# Patient Record
Sex: MALE | Race: WHITE | URBAN - METROPOLITAN AREA
[De-identification: names, ages, dates, MRNs, and addresses within clinical notes are randomized per-mention and may not be internally consistent; named-entity substitution may affect disease eponyms.]

---

## 2018-08-24 ENCOUNTER — HOSPITAL ENCOUNTER (INPATIENT)
Facility: CLINIC | Age: 23
LOS: 2 days | Discharge: HOME | End: 2018-08-26
Attending: EMERGENCY MEDICINE | Admitting: INTERNAL MEDICINE
Payer: COMMERCIAL

## 2018-08-24 ENCOUNTER — APPOINTMENT (OUTPATIENT)
Dept: ULTRASOUND IMAGING | Facility: CLINIC | Age: 23
End: 2018-08-24
Attending: PHYSICIAN ASSISTANT
Payer: COMMERCIAL

## 2018-08-24 DIAGNOSIS — R11.2 NON-INTRACTABLE VOMITING WITH NAUSEA, UNSPECIFIED VOMITING TYPE: ICD-10-CM

## 2018-08-24 DIAGNOSIS — G89.18 ACUTE POST-OPERATIVE PAIN: Primary | ICD-10-CM

## 2018-08-24 DIAGNOSIS — R79.89 ELEVATED LFTS: ICD-10-CM

## 2018-08-24 DIAGNOSIS — K81.0 ACUTE CHOLECYSTITIS: ICD-10-CM

## 2018-08-24 PROCEDURE — 76705 ECHO EXAM OF ABDOMEN: CPT

## 2018-08-24 PROCEDURE — 86709 HEPATITIS A IGM ANTIBODY: CPT | Performed by: PHYSICIAN ASSISTANT

## 2018-08-24 PROCEDURE — 96365 THER/PROPH/DIAG IV INF INIT: CPT

## 2018-08-24 PROCEDURE — 25000128 H RX IP 250 OP 636: Performed by: INTERNAL MEDICINE

## 2018-08-24 PROCEDURE — 86790 VIRUS ANTIBODY NOS: CPT | Performed by: PHYSICIAN ASSISTANT

## 2018-08-24 PROCEDURE — 12000000 ZZH R&B MED SURG/OB

## 2018-08-24 PROCEDURE — 99285 EMERGENCY DEPT VISIT HI MDM: CPT | Mod: 25

## 2018-08-24 PROCEDURE — 99222 1ST HOSP IP/OBS MODERATE 55: CPT | Mod: AI | Performed by: INTERNAL MEDICINE

## 2018-08-24 PROCEDURE — 25000128 H RX IP 250 OP 636: Performed by: PHYSICIAN ASSISTANT

## 2018-08-24 PROCEDURE — 86803 HEPATITIS C AB TEST: CPT | Performed by: PHYSICIAN ASSISTANT

## 2018-08-24 PROCEDURE — 96361 HYDRATE IV INFUSION ADD-ON: CPT

## 2018-08-24 RX ORDER — ESCITALOPRAM OXALATE 10 MG/1
10 TABLET ORAL DAILY
Status: DISCONTINUED | OUTPATIENT
Start: 2018-08-25 | End: 2018-08-26 | Stop reason: HOSPADM

## 2018-08-24 RX ORDER — AMOXICILLIN 250 MG
1 CAPSULE ORAL 2 TIMES DAILY PRN
Status: DISCONTINUED | OUTPATIENT
Start: 2018-08-24 | End: 2018-08-26 | Stop reason: HOSPADM

## 2018-08-24 RX ORDER — BUPROPION HYDROCHLORIDE 150 MG/1
150 TABLET ORAL EVERY MORNING
COMMUNITY

## 2018-08-24 RX ORDER — MORPHINE SULFATE 2 MG/ML
2-4 INJECTION, SOLUTION INTRAMUSCULAR; INTRAVENOUS EVERY 4 HOURS PRN
Status: DISCONTINUED | OUTPATIENT
Start: 2018-08-24 | End: 2018-08-26 | Stop reason: HOSPADM

## 2018-08-24 RX ORDER — ONDANSETRON 2 MG/ML
4 INJECTION INTRAMUSCULAR; INTRAVENOUS EVERY 6 HOURS PRN
Status: DISCONTINUED | OUTPATIENT
Start: 2018-08-24 | End: 2018-08-26 | Stop reason: HOSPADM

## 2018-08-24 RX ORDER — AMOXICILLIN 250 MG
2 CAPSULE ORAL 2 TIMES DAILY
Status: DISCONTINUED | OUTPATIENT
Start: 2018-08-24 | End: 2018-08-26 | Stop reason: HOSPADM

## 2018-08-24 RX ORDER — PIPERACILLIN SODIUM, TAZOBACTAM SODIUM 3; .375 G/15ML; G/15ML
3.38 INJECTION, POWDER, LYOPHILIZED, FOR SOLUTION INTRAVENOUS ONCE
Status: COMPLETED | OUTPATIENT
Start: 2018-08-24 | End: 2018-08-24

## 2018-08-24 RX ORDER — ACETAMINOPHEN 325 MG/1
650 TABLET ORAL EVERY 4 HOURS PRN
Status: DISCONTINUED | OUTPATIENT
Start: 2018-08-24 | End: 2018-08-26 | Stop reason: HOSPADM

## 2018-08-24 RX ORDER — SODIUM CHLORIDE 9 MG/ML
INJECTION, SOLUTION INTRAVENOUS CONTINUOUS
Status: DISCONTINUED | OUTPATIENT
Start: 2018-08-24 | End: 2018-08-26 | Stop reason: HOSPADM

## 2018-08-24 RX ORDER — NALOXONE HYDROCHLORIDE 0.4 MG/ML
.1-.4 INJECTION, SOLUTION INTRAMUSCULAR; INTRAVENOUS; SUBCUTANEOUS
Status: DISCONTINUED | OUTPATIENT
Start: 2018-08-24 | End: 2018-08-26 | Stop reason: HOSPADM

## 2018-08-24 RX ORDER — BISACODYL 10 MG
10 SUPPOSITORY, RECTAL RECTAL DAILY PRN
Status: DISCONTINUED | OUTPATIENT
Start: 2018-08-24 | End: 2018-08-26 | Stop reason: HOSPADM

## 2018-08-24 RX ORDER — AMOXICILLIN 250 MG
1 CAPSULE ORAL 2 TIMES DAILY
Status: DISCONTINUED | OUTPATIENT
Start: 2018-08-24 | End: 2018-08-26 | Stop reason: HOSPADM

## 2018-08-24 RX ORDER — AMOXICILLIN 250 MG
2 CAPSULE ORAL 2 TIMES DAILY PRN
Status: DISCONTINUED | OUTPATIENT
Start: 2018-08-24 | End: 2018-08-26 | Stop reason: HOSPADM

## 2018-08-24 RX ORDER — ESCITALOPRAM OXALATE 10 MG/1
10 TABLET ORAL DAILY
COMMUNITY

## 2018-08-24 RX ORDER — BUSPIRONE HYDROCHLORIDE 5 MG/1
5 TABLET ORAL 3 TIMES DAILY
Status: DISCONTINUED | OUTPATIENT
Start: 2018-08-24 | End: 2018-08-26 | Stop reason: HOSPADM

## 2018-08-24 RX ORDER — ONDANSETRON 4 MG/1
4 TABLET, ORALLY DISINTEGRATING ORAL EVERY 6 HOURS PRN
Status: DISCONTINUED | OUTPATIENT
Start: 2018-08-24 | End: 2018-08-26 | Stop reason: HOSPADM

## 2018-08-24 RX ORDER — SODIUM CHLORIDE 9 MG/ML
1000 INJECTION, SOLUTION INTRAVENOUS CONTINUOUS
Status: DISCONTINUED | OUTPATIENT
Start: 2018-08-24 | End: 2018-08-26

## 2018-08-24 RX ORDER — BUPROPION HYDROCHLORIDE 150 MG/1
150 TABLET ORAL EVERY MORNING
Status: DISCONTINUED | OUTPATIENT
Start: 2018-08-25 | End: 2018-08-26 | Stop reason: HOSPADM

## 2018-08-24 RX ORDER — ONDANSETRON 2 MG/ML
4 INJECTION INTRAMUSCULAR; INTRAVENOUS EVERY 30 MIN PRN
Status: DISCONTINUED | OUTPATIENT
Start: 2018-08-24 | End: 2018-08-26

## 2018-08-24 RX ORDER — BUSPIRONE HYDROCHLORIDE 5 MG/1
5 TABLET ORAL 3 TIMES DAILY
COMMUNITY

## 2018-08-24 RX ADMIN — SODIUM CHLORIDE: 9 INJECTION, SOLUTION INTRAVENOUS at 23:04

## 2018-08-24 RX ADMIN — SODIUM CHLORIDE 1000 ML: 9 INJECTION, SOLUTION INTRAVENOUS at 20:41

## 2018-08-24 RX ADMIN — PIPERACILLIN SODIUM,TAZOBACTAM SODIUM 3.38 G: 3; .375 INJECTION, POWDER, FOR SOLUTION INTRAVENOUS at 22:01

## 2018-08-24 RX ADMIN — SODIUM CHLORIDE 1000 ML: 9 INJECTION, SOLUTION INTRAVENOUS at 22:01

## 2018-08-24 RX ADMIN — MORPHINE SULFATE 2 MG: 2 INJECTION, SOLUTION INTRAMUSCULAR; INTRAVENOUS at 23:49

## 2018-08-24 RX ADMIN — ONDANSETRON 4 MG: 2 INJECTION INTRAMUSCULAR; INTRAVENOUS at 23:47

## 2018-08-24 ASSESSMENT — ENCOUNTER SYMPTOMS
HEADACHES: 0
SHORTNESS OF BREATH: 0
DIARRHEA: 1
ABDOMINAL PAIN: 1
FEVER: 0
VOMITING: 1
NAUSEA: 1

## 2018-08-24 ASSESSMENT — PAIN DESCRIPTION - DESCRIPTORS: DESCRIPTORS: SHARP

## 2018-08-24 NOTE — IP AVS SNAPSHOT
[facility] [facility]    [address] [place] [address]    Phone:  [phone]                                       After Visit Summary   8/24/2018    Babs Monahan    MRN: [id number]           After Visit Summary Signature Page     I have received my discharge instructions, and my questions have been answered. I have discussed any challenges I see with this plan with the nurse or doctor.    ..........................................................................................................................................  Patient/Patient Representative Signature      ..........................................................................................................................................  Patient Representative Print Name and Relationship to Patient    ..................................................               ................................................  Date                                            Time    ..........................................................................................................................................  Reviewed by Signature/Title    ...................................................              ..............................................  Date                                                            Time          22EPIC Rev 08/18

## 2018-08-24 NOTE — IP AVS SNAPSHOT
MRN:[id number]                      After Visit Summary   8/24/2018    Babs Monahan    MRN: [id number]           Thank you!     Thank you for choosing [facility] for your care. Our goal is always to provide you with excellent care. Hearing back from our patients is one way we can continue to improve our services. Please take a few minutes to complete the written survey that you may receive in the mail after you visit with us. Thank you!        Patient Information     Date Of Birth          1995        Designated Caregiver       Most Recent Value    Caregiver    Will someone help with your care after discharge? yes    Name of designated caregiver Deloris    Phone number of caregiver [phone]    Caregiver address [address] [facility]      About your hospital stay     You were admitted on:  August 24, 2018 You last received care in the:  [facility] [address] [facility]    You were discharged on:  August 26, 2018        Reason for your hospital stay       Acute Cholecystitis with Cholelithiasis            Reason for your hospital stay       You were admitted with acute cholecystitis and had lap vaughn                  Who to Call     For medical emergencies, please call 911.  For non-urgent questions about your medical care, please call your primary care provider or clinic, None  For questions related to your surgery, please call your surgery clinic        Attending Provider     Provider Specialty    Jonathan Boucher MD Emergency Medicine    [facility], Hero Romero MD Internal Medicine       Primary Care Provider Fax #    Physician No Ref-Primary [phone]      After Care Instructions     Activity       Your activity upon discharge: activity as tolerated, ambulate in house, no driving while on analgesics and no heavy lifting for 2 weeks            Activity       Your activity upon discharge: activity as tolerated            Diet       Follow this diet upon discharge: Advance  to a regular diet as tolerated            Diet       Follow this diet upon discharge: Orders Placed This Encounter     Low fat diet            May discharge when       Discharge to home when the following criteria have been met:    Stable vital signs    Oral temperature < 100 o F    Return to baseline mental status    No bleeding at incision site    Able to tolerate oral fluids    Minimal pain reported and/or controlled with oral analgesics    Minimal nausea    Ambulates with assistance appropriate to age and health status  HOSPITALIST APPROVAL            Wound care and dressings       Instructions to care for your wound at home: may get incision wet in shower but do not soak or scrub.                  Follow-up Appointments     Follow-up and recommended labs and tests       Our office will contact you within 2 weeks to check on your progress and answer any questions you may have.  If you are doing well, you will not need to return for a follow up appointment.  If any concerns are identified over the phone, we may ask you to make an appointment to see a provider in our clinic.   If you have not received a phone call, have any questions or concerns, or would like to be seen, please call us at [phone] and ask to speak with our nurse.  We are located at [address] [place] [address].            Follow-up and recommended labs and tests        Follow up with Dr Degroot as needed.                  Pending Results     Date and Time Order Name Status Description    8/25/2018 0500 Hepatitis B core antibody IgM In process     8/25/2018 0500 Hepatitis B Surface Antibody In process     8/24/2018 2041 Hepatitis E Antibody IgM In process     8/24/2018 2041 Hepatitis A antibody IgM In process     8/24/2018 2035 Hepatitis C antibody In process             Statement of Approval     Ordered          08/26/18 1553  I have reviewed and agree with all the recommendations and orders detailed in this document.   "EFFECTIVE NOW     Approved and electronically signed by:  Hero Warner MD             Admission Information     Date & Time Provider Department Dept. Phone    2018 Hero Warner MD [facility] [address] [facility] [phone]      Your Vitals Were     Blood Pressure Pulse Temperature Respirations Height Weight    144/90 (BP Location: Left arm) 95 98  F (36.7  C) (Axillary) 16 1.676 m (5' 6\") 73.5 kg (162 lb 0.6 oz)    Pulse Oximetry BMI (Body Mass Index)                98% 26.15 kg/m2          MyChart Information     [facility] lets you send messages to your doctor, view your test results, renew your prescriptions, schedule appointments and more. To sign up, go to [email or web address] . Click on \"Log in\" on the left side of the screen, which will take you to the Welcome page. Then click on \"Sign up Now\" on the right side of the page.     You will be asked to enter the access code listed below, as well as some personal information. Please follow the directions to create your username and password.     Your access code is: [email or web address]  Expires: 2018  4:10 PM     Your access code will  in 90 days. If you need help or a new code, please call your [facility] clinic or [phone].        Care EveryWhere ID     This is your Care EveryWhere ID. This could be used by other organizations to access your [facility] medical records  [id number]        Equal Access to Services     [facility] : Hadii aad ku hadasho Soomaali, waaxda luqadaha, qaybta kaalmada yuliyaegyada, marilynn wright . So [facility] [phone].    ATENCIÓN: Si habla español, tiene a edwards disposición servicios gratuitos de asistencia lingüística. Llame al [phone].    We comply with applicable federal civil rights laws and [place] laws. We do not discriminate on the basis of race, color, national origin, age, disability, sex, sexual orientation, or gender identity.               Review of your " medicines      START taking        Dose / Directions    oxyCODONE IR 5 MG tablet   Commonly known as:  ROXICODONE   Used for:  Elevated LFTs, Acute post-operative pain        Dose:  5-10 mg   Take 1-2 tablets (5-10 mg) by mouth every 4 hours as needed for pain   Quantity:  20 tablet   Refills:  0         CONTINUE these medicines which have NOT CHANGED        Dose / Directions    buPROPion 150 MG 24 hr tablet   Commonly known as:  WELLBUTRIN XL        Dose:  150 mg   Take 150 mg by mouth every morning   Refills:  0       busPIRone 5 MG tablet   Commonly known as:  BUSPAR   Notes to Patient:  Skipped 4pm dose today as too close to prior dose        Dose:  5 mg   Take 5 mg by mouth 3 times daily   Refills:  0       escitalopram 10 MG tablet   Commonly known as:  LEXAPRO        Dose:  10 mg   Take 10 mg by mouth daily   Refills:  0       METHYLFOLATE PO   Notes to Patient:  Resume per prior routine        Refills:  0            Where to get your medicines      Some of these will need a paper prescription and others can be bought over the counter. Ask your nurse if you have questions.     Bring a paper prescription for each of these medications     oxyCODONE IR 5 MG tablet                Protect others around you: Learn how to safely use, store and throw away your medicines at www.disposemymeds.org.        Information about OPIOIDS     PRESCRIPTION OPIOIDS: WHAT YOU NEED TO KNOW   We gave you an opioid (narcotic) pain medicine. It is important to manage your pain, but opioids are not always the best choice. You should first try all the other options your care team gave you. Take this medicine for as short a time (and as few doses) as possible.    Some activities can increase your pain, such as bandage changes or therapy sessions. It may help to take your pain medicine 30 to 60 minutes before these activities. Reduce your stress by getting enough sleep, working on hobbies you enjoy and practicing relaxation or meditation.  Talk to your care team about ways to manage your pain beyond prescription opioids.    These medicines have risks:    DO NOT drive when on new or higher doses of pain medicine. These medicines can affect your alertness and reaction times, and you could be arrested for driving under the influence (DUI). If you need to use opioids long-term, talk to your care team about driving.    DO NOT operate heavy machinery    DO NOT do any other dangerous activities while taking these medicines.    DO NOT drink any alcohol while taking these medicines.     If the opioid prescribed includes acetaminophen, DO NOT take with any other medicines that contain acetaminophen. Read all labels carefully. Look for the word  acetaminophen  or  Tylenol.  Ask your pharmacist if you have questions or are unsure.    You can get addicted to pain medicines, especially if you have a history of addiction (chemical, alcohol or substance dependence). Talk to your care team about ways to reduce this risk.    All opioids tend to cause constipation. Drink plenty of water and eat foods that have a lot of fiber, such as fruits, vegetables, prune juice, apple juice and high-fiber cereal. Take a laxative (Miralax, milk of magnesia, Colace, Senna) if you don t move your bowels at least every other day. Other side effects include upset stomach, sleepiness, dizziness, throwing up, tolerance (needing more of the medicine to have the same effect), physical dependence and slowed breathing.    Store your pills in a secure place, locked if possible. We will not replace any lost or stolen medicine. If you don t finish your medicine, please throw away (dispose) as directed by your pharmacist. The [place] Pollution Control Agency has more information about safe disposal: https://www.pca.[facility].mn.us/living-green/managing-unwanted-medications             Medication List: This is a list of all your medications and when to take them. Check marks below indicate your daily  home schedule. Keep this list as a reference.      Medications           Morning Afternoon Evening Bedtime As Needed    buPROPion 150 MG 24 hr tablet   Commonly known as:  WELLBUTRIN XL   Take 150 mg by mouth every morning   Last time this was given:  150 mg on 8/26/2018  1:49 PM   Next Dose Due:  8/27 @8am                                   busPIRone 5 MG tablet   Commonly known as:  BUSPAR   Take 5 mg by mouth 3 times daily   Last time this was given:  5 mg on 8/26/2018  1:49 PM   Next Dose Due:  8/26 @9pm   Notes to Patient:  Skipped 4pm dose today as too close to prior dose                                         escitalopram 10 MG tablet   Commonly known as:  LEXAPRO   Take 10 mg by mouth daily   Last time this was given:  10 mg on 8/26/2018  1:49 PM   Next Dose Due:  8/27 @8am                                   METHYLFOLATE PO   Notes to Patient:  Resume per prior routine                                oxyCODONE IR 5 MG tablet   Commonly known as:  ROXICODONE   Take 1-2 tablets (5-10 mg) by mouth every 4 hours as needed for pain   Last time this was given:  5 mg on 8/26/2018  2:58 PM   Next Dose Due:  Available 8/26 @7pm

## 2018-08-25 ENCOUNTER — APPOINTMENT (OUTPATIENT)
Dept: MRI IMAGING | Facility: CLINIC | Age: 23
End: 2018-08-25
Attending: INTERNAL MEDICINE
Payer: COMMERCIAL

## 2018-08-25 LAB
ALBUMIN SERPL-MCNC: 3.7 G/DL (ref 3.4–5)
ALP SERPL-CCNC: 170 U/L (ref 40–150)
ALT SERPL W P-5'-P-CCNC: 761 U/L (ref 0–70)
ANION GAP SERPL CALCULATED.3IONS-SCNC: 6 MMOL/L (ref 3–14)
AST SERPL W P-5'-P-CCNC: 291 U/L (ref 0–45)
BILIRUB DIRECT SERPL-MCNC: 0.4 MG/DL (ref 0–0.2)
BILIRUB SERPL-MCNC: 1.5 MG/DL (ref 0.2–1.3)
BUN SERPL-MCNC: 8 MG/DL (ref 7–30)
CALCIUM SERPL-MCNC: 8.8 MG/DL (ref 8.5–10.1)
CHLORIDE SERPL-SCNC: 108 MMOL/L (ref 94–109)
CO2 SERPL-SCNC: 29 MMOL/L (ref 20–32)
CREAT SERPL-MCNC: 0.93 MG/DL (ref 0.66–1.25)
ERYTHROCYTE [DISTWIDTH] IN BLOOD BY AUTOMATED COUNT: 13 % (ref 10–15)
GFR SERPL CREATININE-BSD FRML MDRD: >90 ML/MIN/1.7M2
GLUCOSE SERPL-MCNC: 101 MG/DL (ref 70–99)
HCT VFR BLD AUTO: 44.8 % (ref 40–53)
HGB BLD-MCNC: 15.6 G/DL (ref 13.3–17.7)
INR PPP: 1.02 (ref 0.86–1.14)
MCH RBC QN AUTO: 31.5 PG (ref 26.5–33)
MCHC RBC AUTO-ENTMCNC: 34.8 G/DL (ref 31.5–36.5)
MCV RBC AUTO: 91 FL (ref 78–100)
PLATELET # BLD AUTO: 218 10E9/L (ref 150–450)
POTASSIUM SERPL-SCNC: 4 MMOL/L (ref 3.4–5.3)
PROT SERPL-MCNC: 6.8 G/DL (ref 6.8–8.8)
RBC # BLD AUTO: 4.95 10E12/L (ref 4.4–5.9)
SODIUM SERPL-SCNC: 143 MMOL/L (ref 133–144)
WBC # BLD AUTO: 6.3 10E9/L (ref 4–11)

## 2018-08-25 PROCEDURE — 25000128 H RX IP 250 OP 636: Performed by: INTERNAL MEDICINE

## 2018-08-25 PROCEDURE — A9585 GADOBUTROL INJECTION: HCPCS | Performed by: INTERNAL MEDICINE

## 2018-08-25 PROCEDURE — 99222 1ST HOSP IP/OBS MODERATE 55: CPT | Mod: 25 | Performed by: SURGERY

## 2018-08-25 PROCEDURE — 25000125 ZZHC RX 250: Performed by: INTERNAL MEDICINE

## 2018-08-25 PROCEDURE — 36415 COLL VENOUS BLD VENIPUNCTURE: CPT | Performed by: INTERNAL MEDICINE

## 2018-08-25 PROCEDURE — 80048 BASIC METABOLIC PNL TOTAL CA: CPT | Performed by: INTERNAL MEDICINE

## 2018-08-25 PROCEDURE — 80076 HEPATIC FUNCTION PANEL: CPT | Performed by: INTERNAL MEDICINE

## 2018-08-25 PROCEDURE — 12000000 ZZH R&B MED SURG/OB

## 2018-08-25 PROCEDURE — 85610 PROTHROMBIN TIME: CPT | Performed by: INTERNAL MEDICINE

## 2018-08-25 PROCEDURE — 74183 MRI ABD W/O CNTR FLWD CNTR: CPT

## 2018-08-25 PROCEDURE — 99232 SBSQ HOSP IP/OBS MODERATE 35: CPT | Performed by: INTERNAL MEDICINE

## 2018-08-25 PROCEDURE — 25000132 ZZH RX MED GY IP 250 OP 250 PS 637: Performed by: INTERNAL MEDICINE

## 2018-08-25 PROCEDURE — 86706 HEP B SURFACE ANTIBODY: CPT | Performed by: PHYSICIAN ASSISTANT

## 2018-08-25 PROCEDURE — 86705 HEP B CORE ANTIBODY IGM: CPT | Performed by: PHYSICIAN ASSISTANT

## 2018-08-25 PROCEDURE — 87350 HEPATITIS BE AG IA: CPT | Performed by: PHYSICIAN ASSISTANT

## 2018-08-25 PROCEDURE — 85027 COMPLETE CBC AUTOMATED: CPT | Performed by: INTERNAL MEDICINE

## 2018-08-25 RX ORDER — GADOBUTROL 604.72 MG/ML
10 INJECTION INTRAVENOUS ONCE
Status: COMPLETED | OUTPATIENT
Start: 2018-08-25 | End: 2018-08-25

## 2018-08-25 RX ADMIN — BUSPIRONE HYDROCHLORIDE 5 MG: 5 TABLET ORAL at 11:53

## 2018-08-25 RX ADMIN — BUSPIRONE HYDROCHLORIDE 5 MG: 5 TABLET ORAL at 16:05

## 2018-08-25 RX ADMIN — GADOBUTROL 10 ML: 604.72 INJECTION INTRAVENOUS at 16:49

## 2018-08-25 RX ADMIN — SENNOSIDES AND DOCUSATE SODIUM 2 TABLET: 8.6; 5 TABLET ORAL at 21:38

## 2018-08-25 RX ADMIN — BUPROPION HYDROCHLORIDE 150 MG: 150 TABLET, FILM COATED, EXTENDED RELEASE ORAL at 11:53

## 2018-08-25 RX ADMIN — PANTOPRAZOLE SODIUM 40 MG: 40 INJECTION, POWDER, FOR SOLUTION INTRAVENOUS at 08:25

## 2018-08-25 RX ADMIN — MORPHINE SULFATE 2 MG: 2 INJECTION, SOLUTION INTRAMUSCULAR; INTRAVENOUS at 20:28

## 2018-08-25 RX ADMIN — MORPHINE SULFATE 2 MG: 2 INJECTION, SOLUTION INTRAMUSCULAR; INTRAVENOUS at 08:22

## 2018-08-25 RX ADMIN — ESCITALOPRAM 10 MG: 10 TABLET, FILM COATED ORAL at 11:53

## 2018-08-25 RX ADMIN — ONDANSETRON 4 MG: 4 TABLET, ORALLY DISINTEGRATING ORAL at 07:26

## 2018-08-25 RX ADMIN — MORPHINE SULFATE 2 MG: 2 INJECTION, SOLUTION INTRAMUSCULAR; INTRAVENOUS at 15:17

## 2018-08-25 RX ADMIN — SODIUM CHLORIDE: 9 INJECTION, SOLUTION INTRAVENOUS at 18:47

## 2018-08-25 RX ADMIN — ONDANSETRON 4 MG: 4 TABLET, ORALLY DISINTEGRATING ORAL at 20:23

## 2018-08-25 RX ADMIN — SODIUM CHLORIDE: 9 INJECTION, SOLUTION INTRAVENOUS at 07:20

## 2018-08-25 ASSESSMENT — ACTIVITIES OF DAILY LIVING (ADL)
ADLS_ACUITY_SCORE: 11

## 2018-08-25 NOTE — PROGRESS NOTES
RECEIVING UNIT ED HANDOFF REVIEW    ED Nurse Handoff Report was reviewed by: Debra Begum on August 24, 2018 at 10:17 PM

## 2018-08-25 NOTE — CONSULTS
Surgery Consultation, [facility], CRISTEL Tinoco MD    Babs Monahan MRN# [id number]   YOB: 1995 Age: 23 year old     PCP:  No primary care provider on file. None    Chief Complaint:  Right upper quadrant pain, nausea, elevated liver function tests    History of Present Illness:  Babs Monahan is a 23 year old male who presented with several episodes of upper abdominal pain and intermittent nausea, usually after eating.  Commonly associated with eating greasy foods.  He was seen in an urgent care for increasing abdominal pain, nausea, and emesis.  This was thought to be partly due to an episode of binge drinking around this time.  Patient has a noted history of alcohol abuse.  He was diagnosed with dehydration and recommendation was made for increased oral intake of fluids.  Following his discharge from clinic it was noted that his LFTs were significantly elevated and he was directed to the emergency department.  Ultrasound was also performed which showed thickened gallbladder wall with gallstones.  This was thought to be potentially consistent with acute cholecystitis.  Patient has a strong family history of gallbladder disease.  He has never had any previous abdominal surgeries.  In discussions, he has had many episodes of upper abdominal pain, usually after eating.  Initially this began with rich fatty foods but has begun to occur with much more bland benign foods.  He has never had abdominal surgery.  We are asked to assess the patient and provide surgical recommendations.    PMH:  Babs Monahan has a history of previous alcohol abuse, depression  PSH:  Babs Monahan has not undergone any previous abdominal surgeries.    Home medications and allergies reviewed.    Social History:  Babs Monahan    Family History:  Babs Monahan family history is not on file.    ROS:  The 10 point Review of Systems is negative other than noted in the HPI.  Mild nausea currently without  "emesis.  Pain minimal.  No fever or chills.  No recent weight loss..    Physical Exam:  Blood pressure 120/71, pulse 90, temperature 98  F (36.7  C), temperature source Oral, resp. rate 16, height 1.676 m (5' 6\"), weight 77.5 kg (170 lb 13.7 oz), SpO2 95 %.  170 lbs 13.7 oz  Thin healthy young gentleman in no distress.  Patient has a pleasant affect, speaks without difficulty.   Pupils equal round and reactive to light.   No cervical lymphadenopathy or thyromegaly.   Lung fields clear, breathing comfortably.   Heart normal sinus rhythm.  No murmurs rubs or gallops.  Abdomen soft, nontender, nondistended.  Minimal tenderness in the right upper quadrant, no masses appreciated. No peritoneal signs or rebound.  Skin warm, dry, without rashes or lesions.    All new lab and imaging data was reviewed.  Abdominal ultrasound shows gallbladder with stones and sludge.  Some wall thickening, positive sonographic Thakkar sign.     Assessment/plan:  Babs Monahan is a 23 year old male with signs and symptoms suggesting acute cholecystitis.  Other possibilities include gastritis, hepatitis, or choledocholithiasis.  His labs certainly suggest some derangement within the liver but this could likely be due to acute cholecystitis.  Agree with initial suggestion for MRCP.  If this does not reveal any choledocholithiasis, I would recommend upper scopic cholecystectomy tomorrow.  This may be accompanied by intraoperative cholangiogram. Surgical comorbidities include history of alcohol abuse.  I feel the patient is a good candidate for the surgery and that this should be done while the patient is inpatient.  I will follow-up on the MRCP if this gets completed today but he is tentatively scheduled for surgery tomorrow.    Sergio Tinoco M.D.  [facility]  [phone]    Please route or send letter to:  Primary Care Provider (PCP) and Referring Provider  "

## 2018-08-25 NOTE — H&P
Admitted:     08/24/2018      DATE OF ADMISSION: 08/24/2018      PRIMARY CARE PROVIDER:  Jong Falk in [place].      CHIEF COMPLAINT:  Abdominal pain.      HISTORY OF PRESENT ILLNESS:  Babs Monahan is a very pleasant 23-year-old  gentleman with known history of alcoholism, but he had been dry for the last year and a half until a relapse about 3 days ago when he had 6 drinks.  Subsequent to that, the patient started having abdominal pain, nausea and vomiting.  This has been going on for the last 3 days.  He has extensive family history of biliary disease as well.  The patient also admits to increased psychosocial stressors including injury to his cat, and he has been too busy to cook, so he has been eating out a lot as well.  His pain seemed to get worse with food.  He went to urgent care clinic today.  He was seen and recommended that he drink Pedialyte.  He was discharged after getting some blood drawn and the patient had more Pedialyte, which did make him feel better.  However, after the labs came back, the urgent care was concerned and sent the patient to [facility] for further evaluation.  The specific labs that were noted were abnormal LFTs.  The patient's total bilirubin was elevated at 3.3, alkaline phosphatase 202, ALT was 1168, AST was 762 and lipase was 26.3.      In the Emergency Department, the patient was seen by Georgi To PA-C.  The patient was afebrile, vital signs were stable.  He did not receive any pain medications, but received Zosyn and a liter of normal saline.  Currently, the patient does not have any significant pain or nausea.  His mother accompanies him.      PAST MEDICAL HISTORY:    1.  History of alcoholism.    2.  Depression, generalized anxiety disorder.    3.  Reflux disease.      PAST SURGICAL HISTORY:  Dental surgery, extensive for bone reconstruction and implants due to poorly formed dentition at the age of 18.      FAMILY HISTORY:  Significant for  biliary disease in aunt, grandmother and grandfather.  There is also history of anxiety, depression, diabetes and anemia.      SOCIAL HISTORY:  He is single.  No tobacco.  History of alcoholism and has been sober for a year and a half until relapse about 3 days ago.  He works in finance.  He is the oldest of 5 kids.  Accompanied by his mother.      ALLERGIES:  NO KNOWN DRUG ALLERGIES.      CURRENT MEDICATIONS:   1.  Wellbutrin- mg daily.   2.  BuSpar 5 mg 3 times a day.   3.  Lexapro 10 mg once a day.    4.  Levomefolate glucosamine 1 tablet daily.      REVIEW OF SYSTEMS:  Ten-point review of systems reviewed.  Denies any fevers, chills, sweats. Positive for nausea, vomiting. No significant diarrhea.  No confusion, no bruising.  No hematemesis, no bloody stools.      PHYSICAL EXAMINATION:   VITAL SIGNS:  Temperature 98.5, heart rate 90, respiration 18, blood pressure 136/67, sats 97% on room air.   GENERAL:  The patient is alert, oriented x 3, no distress.   HEENT:  Unremarkable.   NECK:  Veins not distended.   LUNGS:  Clear to auscultation.   CARDIOVASCULAR:  S1, S2, regular rate and rhythm.   ABDOMEN:  Normoactive. He has tenderness in epigastrium and right upper quadrant region.  There is no guarding or rebound.   EXTREMITIES:  No edema.   NEUROLOGIC:  The patient was grossly nonfocal.      LABORATORY DATA:  As dictated in history of present illness.        IMAGING: The patient underwent limited ultrasound of the abdomen in the ED.  This showed gallstones with gallbladder wall thickening and positive sonographic Thakkar sign.  There is no biliary dilatation.      ASSESSMENT:  Babs Monahan is a 23-year-old with a history of binge drinking, 6 drinks of alcohol about 3 days ago, which started the patient having episodes of nausea and vomiting and now finding of abnormalities including elevation of AST, ALT and total bilirubin, but normal lipase, and ultrasound evidence of acute cholecystitis with cholelithiasis  and positive sonographic Thakkar sign, but no biliary dilatation.      PLAN:   1.  Acute cholecystitis.  The patient does have elevation of his transaminases as well as his bilirubin and alkaline phosphatase.  At this juncture, we cannot definitively rule out if the patient has any ductal stones.  We will proceed with an MRCP tonight.  We will keep the patient n.p.o., give him IV fluids, antinausea and pain medications.  We will obtain a formal Surgery consultation.  The patient received Zosyn in the Emergency Department.  Currently no indication to continue antibiotics.  We will repeat his transaminases and hope that these will be downtrending in the morning.  We will check an INR as well.   2.  Depression, anxiety.  We will continue the patient on his medications including Lexapro, BuSpar and Wellbutrin.   3.  History of reflux disease.  We will give the patient IV Protonix.   4.  Deep venous thrombosis prophylaxis.  The patient will receive compression boots.      CODE STATUS:  FULL.         RANDY JOSÉ MD             D: 2018   T: 2018   MT:       Name:     RACHANA KHALIL   MRN:      [id number]        Account:      [id number]   :      1995        Admitted:     2018                   Document: [id number]       cc: Jong Cabral [facility]

## 2018-08-25 NOTE — ED NOTES
"[facility]  ED Nurse Handoff Report    ED Chief complaint: Abnormal Labs (Patient was seen at urgent care today; reports elevated LFT's.  ) and Abdominal Pain (x 2-3 days with associated N/V/poor appetite.)      ED Diagnosis:   Final diagnoses:   Acute cholecystitis   Elevated LFTs   Non-intractable vomiting with nausea, unspecified vomiting type       Code Status: Full Code    Allergies: No Known Allergies    Activity level - Baseline/Home:  Independent    Activity Level - Current:   Independent     Needed?: No    Isolation: No  Infection: Not Applicable  Bariatric?: No    Vital Signs:   Vitals:    08/24/18 2013   BP: 136/67   Pulse: 90   Resp: 18   Temp: 98.5  F (36.9  C)   TempSrc: Oral   SpO2: 97%   Weight: 75.3 kg (166 lb)   Height: 1.676 m (5' 6\")       Cardiac Rhythm: ,        Pain level: 0-10 Pain Scale: 4    Is this patient confused?: No   F[place]ugh - Suicide Severity Rating Scale Completed?  Yes  If yes, what color did the patient score?  White    Patient Report: Initial Complaint: abdominal pain. abnormal labs   Focused Assessment:   Resp: WDL  Cardiac: WDL  Neuro: WDL  GI: pt has had abdominal pain x1-2 weeks. Getting worse. Nausea and vomiting. Patient went to urgent care today and liver enzymes were above 1000.   Tests Performed: labs, US  Abnormal Results: cholycystitis  Treatments provided: antibiotics, fluids    Family Comments: not present     OBS brochure/video discussed/provided to patient: N/A    ED Medications:   Medications   0.9% sodium chloride BOLUS (0 mLs Intravenous Stopped 8/24/18 9615)     Followed by   sodium chloride 0.9% infusion (not administered)   ondansetron (ZOFRAN) injection 4 mg (not administered)   piperacillin-tazobactam (ZOSYN) 3.375 g vial to attach to  mL bag (not administered)       Drips infusing?:  Yes    For the majority of the shift this patient was Green.   Interventions performed were n/a.    Severe Sepsis OR Septic Shock Diagnosis " Present: No      ED NURSE PHONE NUMBER: [phone]

## 2018-08-25 NOTE — PHARMACY-ADMISSION MEDICATION HISTORY
Admission medication history interview status for the 8/24/2018  admission is complete. See EPIC admission navigator for prior to admission medications     Medication history source reliability:Good    Actions taken by pharmacist (provider contacted, etc): Add medications     Additional medication history information not noted on PTA med list :Add all medications    Medication reconciliation/reorder completed by provider prior to medication history? No    Time spent in this activity: 20 minutes    Prior to Admission medications    Medication Sig Last Dose Taking? Auth Provider   buPROPion (WELLBUTRIN XL) 150 MG 24 hr tablet Take 150 mg by mouth every morning 8/23/2018 at Unknown time Yes Unknown, Entered By History   busPIRone (BUSPAR) 5 MG tablet Take 5 mg by mouth 3 times daily 8/23/2018 at Unknown time Yes Unknown, Entered By History   escitalopram (LEXAPRO) 10 MG tablet Take 10 mg by mouth daily 8/23/2018 at Unknown time Yes Unknown, Entered By History   Levomefolate Glucosamine (METHYLFOLATE PO)  Past Month at Unknown time Yes Unknown, Entered By History     August 24, 2018  Dario Rosa [facility].

## 2018-08-25 NOTE — PROGRESS NOTES
"[facility]  Hospitalist Progress Note  Hero Warner MD  08/25/2018    Assessment & Plan   ASSESSMENT:  Babs Monahan is a 23-year-old with a history of binge drinking, 6 drinks of alcohol about 3 days ago, which started the patient having episodes of nausea and vomiting and now finding of abnormalities including elevation of AST, ALT and total bilirubin, but normal lipase, and ultrasound evidence of acute cholecystitis with cholelithiasis and positive sonographic Thakkar sign, but no biliary dilatation.       PLAN:   1.  Acute cholecystitis.    - AST/ALT improving as compared to outside hospital values.  - clinically improved.  - await MRCP.  - plan for lap vaughn in AM with possible intraoperative cholangiogram.     2.  Depression, anxiety.    - continue the patient on his medications including Lexapro, BuSpar and Wellbutrin.   3.  History of reflux disease.   - continue with IV Protonix.   4.  Deep venous thrombosis prophylaxis.  The patient will receive compression boots.       CODE STATUS:  FULL.         Interval History   - chart reviewed  - abdominal pain better    -Data reviewed today: I reviewed all new labs and imaging over the last 24 hours. I personally reviewed no images or EKG's today.    Physical Exam   Heart Rate: 60, Blood pressure 120/71, pulse 90, temperature 98  F (36.7  C), temperature source Oral, resp. rate 16, height 1.676 m (5' 6\"), weight 77.5 kg (170 lb 13.7 oz), SpO2 95 %.  Vitals:    08/24/18 2013 08/25/18 0649   Weight: 75.3 kg (166 lb) 77.5 kg (170 lb 13.7 oz)     Vital Signs with Ranges  Temp:  [97.6  F (36.4  C)-98.5  F (36.9  C)] 98  F (36.7  C)  Pulse:  [90] 90  Heart Rate:  [60-73] 60  Resp:  [16-18] 16  BP: (117-136)/(66-78) 120/71  SpO2:  [95 %-97 %] 95 %  I/O's Last 24 hours       Constitutional: Awake, alert, cooperative, no apparent distress  Respiratory: Clear to auscultation bilaterally, no crackles or wheezing  Cardiovascular: Regular rate and rhythm, " normal S1 and S2, and no murmur noted  GI: Normal bowel sounds, soft, non-distended, mildly tender RUQ  Skin/Integumen: No rashes, no cyanosis, no edema  Other:      Medications   All medications were reviewed.    sodium chloride 1,000 mL (08/24/18 2201)     sodium chloride 100 mL/hr at 08/25/18 0720       buPROPion  150 mg Oral QAM     busPIRone  5 mg Oral TID     escitalopram  10 mg Oral Daily     pantoprazole (PROTONIX) IV  40 mg Intravenous Daily with breakfast     senna-docusate  1 tablet Oral BID    Or     senna-docusate  2 tablet Oral BID        Data     Recent Labs  Lab 08/25/18  0940   WBC 6.3   HGB 15.6   MCV 91      INR 1.02      POTASSIUM 4.0   CHLORIDE 108   CO2 29   BUN 8   CR 0.93   ANIONGAP 6   MARY 8.8   *   ALBUMIN 3.7   PROTTOTAL 6.8   BILITOTAL 1.5*   ALKPHOS 170*   *   *       Recent Results (from the past 24 hour(s))   Abdomen US, limited (RUQ only)    Narrative    US ABDOMEN LIMITED  8/24/2018  9:24 PM     HISTORY: Elevated LFTs, right upper quadrant pain, rule out  cholecystitis, same.    COMPARISON: None.    FINDINGS: The liver is normal in size and texture without focal mass.  There is no intra or extrahepatic biliary dilatation. The common  hepatic duct measures 0.3 cm. There are multiple stones in the  gallbladder. The gallbladder wall is mildly thickened at 0.5 cm. There  is a positive sonographic Thakkar's sign. The pancreas head and body  appear normal. The tail is obscured by bowel gas. The right kidney  measures 10.1 cm and is normal in appearance.       Impression    IMPRESSION:    1. Cholelithiasis. There is gallbladder wall thickening and a positive  sonographic Thakkar's sign. This may be acute cholecystitis.  2. No biliary dilatation.    MD Hero MARQUEZ MD  Text Page  (7am to 6pm)

## 2018-08-25 NOTE — PLAN OF CARE
Problem: Patient Care Overview  Goal: Plan of Care/Patient Progress Review  Outcome: No Change  Morphine x 1 this morning; has not taken this afternoon.  States protonix effective in relieving stomach discomfort related to nausea.  Up ad bogdan in room and skaggs.  NPO.  MRCP pending; has completed checklist.  Continues on IVF.  Plan is for possible lap vaughn tomorrow, seen by Dr. Tinoco, and awaiting MRCP results.

## 2018-08-25 NOTE — PLAN OF CARE
Problem: Patient Care Overview  Goal: Plan of Care/Patient Progress Review  Outcome: No Change  Admission     Patient arrives to room 6614-02 via cart from ED.  Care plan note: Pt a/o x4. Mom at the bedside. Given morphine for abdominal pain with improvement and given Zofran 2x for nausea. NPO for surgical consult this a.m. SBA to BR. VSS on RA. IVF  ml/h continuous patent. MRI scheduled for this a.m. Discharge pending progress, will continue to monitor.      Inpatient nursing criteria listed below were met:     PCD's Documented: Yes  Skin issues/needs documented :NA  Isolation education started/completed NA  Patient allergies verified with patient: Yes  Verified completion of [place] Risk Assessment Tool:  Yes  Verified completion of Guardianship screening tool: No  Fall Prevention: Care plan updated, Education given and documented Yes  Care Plan initiated: Yes  Home medications documented in belongings flowsheet: NA  Patient belongings documented in belongings flowsheet: Yes  Reminder note (belongings/ medications) placed in discharge instructions:Yes  Admission profile/ required documentation complete: Yes

## 2018-08-25 NOTE — ED PROVIDER NOTES
"  History     Chief Complaint:  Abdominal pain and abnormal labs    HPI   Babs Monahan is a 23 year old male, with a history of alcoholism, otherwise healthy, who presents with abdominal pain. The patient reports that prior to arrival today, he was seen by urgent care for 3 days of abdominal pain with nausea and vomiting. He states that this past week has been \"crazy,\" as his \"cat broke its tail,\" and he has been too busy to cook, requiring him to order food in from a variety of different restaurants. The patient also admits to a recent relapse when he drank alcohol about 2 days ago. He notes that he has a weak stomach, which is part of the reason he gave up drinking, and that his stomach pain grew worse after eating food from an Asian restaurant. Due to continued pain, nausea and vomiting, the patient states that he presented today at urgent care for evaluation, where he was seen by Dr. Stout, who obtained labs and recommended the patient start drinking Pedialyte. He was not given medication for his nausea. While in the clinic the patient did begin to feel better, and since drinking Pedialyte his abdominal pain is less intense, without vomiting, although it is still present while in the ED. Later this evening, however, the patient's lab results indicated elevated liver enzymes, at which time the patient was told to come here for further work-up and evaluation. While in the ED, the patient endorses some diarrhea, but reports that he has had limited bowel movents do to nausea induced loss of appetite. The patient otherwise denies fever, headache, chest pain, difficulty breathing or any other acute symptoms at this time.     Allergies:  No known drug allergies    Medications:    Lexapro  Buspar  Wellbutrin  Methylfolate    Past Medical History:    Alcoholism  Depression  FAREED  Acid reflux    Past Surgical History:    History reviewed. No pertinent surgical history.    Family History:  " "  Anxiety  Depression  Diabetes  Anemia    Social History:  Smoking Status: Former  Smokeless Tobacco: Never  Alcohol Use: No, history of alcoholism  Marital Status:  Single     Review of Systems   Constitutional: Negative for fever.   Respiratory: Negative for shortness of breath.    Cardiovascular: Negative for chest pain.   Gastrointestinal: Positive for abdominal pain, diarrhea, nausea and vomiting.   Neurological: Negative for headaches.   All other systems reviewed and are negative.    Physical Exam   First Vitals:  BP: 136/67  Pulse: 90  Temp: 98.5  F (36.9  C)  Resp: 18  Height: 167.6 cm (5' 6\")  Weight: 75.3 kg (166 lb)  SpO2: 97 %    Physical Exam    General: Well-nourished, no acute distress  Eyes: PERRL, conjunctivae pink no scleral icterus or conjunctival injection  ENT:  Moist mucus membranes  Respiratory:  No respiratory distress, no wheezes, crackles or rales  CV: Normal rate, no murmurs, rubs or gallops  GI: Mild right upper quadrant tenderness otherwise nontender, nondistended, normal bowel sounds, no rebound or guarding  : No CVA tenderness  Skin: Warm, dry.  No rashes or petechiae  Musculoskeletal: No peripheral edema or calf tenderness  Neuro: Alert and oriented to person/place/time, cranial nerves II through XII intact, strength 5 out of 5 throughout  Psychiatric: Normal affect    Emergency Department Course     Imaging:  Radiology findings were communicated with the patient who voiced understanding of the findings.  Abdomen US, Limited (RUQ Only):  IMPRESSION:    1. Cholelithiasis. There is gallbladder wall thickening and a positive  sonographic Thakkar's sign. This may be acute cholecystitis.  2. No biliary dilatation.    Per report by radiology    Laboratory:  Laboratory findings were communicated with the patient who voiced understanding of the findings.  Hepatitis B Surface Antibody: Pending  Hepatitis B core antibody: Pending  Hepatitis Be antigen: Pending  Hepatitis C antibody: " Pending  Hepatitis A antibody IgM: Pending  Hepatitis E antibody IgM: Pending    Interventions:  2041 - NS Bolus 1,000mL IV  PRN - Zofran 4 mg   2146 - Zosyn 375 g vial w/  mL bag IV    Medications   0.9% sodium chloride BOLUS (0 mLs Intravenous Stopped 8/24/18 2141)     Followed by   sodium chloride 0.9% infusion (not administered)   ondansetron (ZOFRAN) injection 4 mg (not administered)   piperacillin-tazobactam (ZOSYN) 3.375 g vial to attach to  mL bag (not administered)      Emergency Department Course:  Nursing notes and vitals reviewed.    The patient was sent for an abdomen US, limited while in the emergency department, results above.     2023: I performed an exam of the patient as documented above.   2138: Imaging results reviewed, patient rechecked and updated.     Findings and plan explained to the Patient who consents to admission.     Discussed the patient with Dr. Warner, who will admit the patient to a [facility] bed for further monitoring, evaluation, and treatment.     Impression & Plan      Medical Decision Making:     Babs Monahan is a 23 year old male who presents for evaluation of abnormal lab values.  Patient has had 4-5 days of vomiting and some right upper quadrant pain that started earlier this afternoon to be presented to an urgent care in the [place] blood work and sent him home.  Patient said that upon returning home stretching some Pedialyte next he started to feel better when he received a call from the clinic stating that his liver enzymes were elevated and that he should present to the emergency department for further evaluation.  Due to his ALT being elevated over 1000 I was concerned that LFTs could be related to acute hepatitis, so I did send off some viral hepatitis studies that are still in process.  Patient was also sent over for a right upper quadrant ultrasound significant for cholelithiasis with gallbladder wall thickening and a positive Thakkar sign.  The workup in the  Emergency Room is concerning for acute cholecystitis.  Antibiotics have been started and the patient will be admitted to the medicine service for further evaluation and treatment with a likely surgical consultation.  There is no evidence at this point of serious complications of cholecystitis such as gangrenous cholecystitis, septic shock, etc.  No signs of other complications such as CBD stone, ascending cholangitis, gallstone pancreatitis.  Given constellation of symptoms, lab data and ultrasound I doubt GERD, gastritis, PUD, perforated ulcer, diverticulitis, colitis.  Plan to admit admit the patient to a general medicine bed with consultation with GI general surgery tomorrow.  Patient is currently pain and nausea free in the emergency department.  All questions were answered prior to admission.      Diagnosis:    ICD-10-CM    1. Acute cholecystitis K81.0    2. Elevated LFTs R79.89    3. Non-intractable vomiting with nausea, unspecified vomiting type R11.2        Disposition:  Admitted to med bed    Azul Courtney  8/24/2018    EMERGENCY DEPARTMENT  I, Azul Valdez, am serving as a scribe at 8:23 PM on 8/24/2018 to document services personally performed by Jonathan Boucher, based on my observations and the provider's statements to me.       Georgi To PA-C  08/24/18 2571

## 2018-08-25 NOTE — ED PROVIDER NOTES
"Emergency Department Attending Supervision Note  8/24/2018  9:01 PM      I evaluated this patient in conjunction with Georgi To PA-C    Briefly, the patient presented with several days of nonbloody vomiting and more recent right upper quadrant abdominal pain.  He has a history of heavy alcohol use, at one point consuming several dozen drinks per day, but went through treatment and was sober for some time before relapsing more recently.  He was seen an outside facility where liver enzymes were noted to be quite abnormal and he was referred here for further evaluation.  He denies any recent foreign travel, IV drug use, history of liver problems, Tylenol consumption, or history of viral hepatitis.    On my exam, he has tenderness in the right upper quadrant though feels better than he did a few hours ago.  No fever.  I reviewed his laboratory studies which show markedly elevated ALT and AST as well as hyperbilirubinemia.  Right upper quadrant ultrasound is consistent with acute cholecystitis.  IV antibiotics were initiated.    My impression is that he would benefit from hospitalization for close monitoring and surgical consultation was strong consideration given to cholecystectomy.  Alternate causes of acute hepatitis were considered.  He will be admitted to the hospital service for further care.    Diagnosis    ICD-10-CM    1. Acute cholecystitis K81.0    2. Elevated LFTs R79.89    3. Non-intractable vomiting with nausea, unspecified vomiting type R11.2      Jonathan Boucher MD    This record was created at least in part using electronic voice recognition software, so please excuse any \"typos.\"           Jonathan Boucher MD  08/24/18 2231    "

## 2018-08-26 ENCOUNTER — ANESTHESIA EVENT (OUTPATIENT)
Dept: SURGERY | Facility: CLINIC | Age: 23
End: 2018-08-26
Payer: COMMERCIAL

## 2018-08-26 ENCOUNTER — ANESTHESIA (OUTPATIENT)
Dept: SURGERY | Facility: CLINIC | Age: 23
End: 2018-08-26
Payer: COMMERCIAL

## 2018-08-26 VITALS
TEMPERATURE: 98 F | DIASTOLIC BLOOD PRESSURE: 90 MMHG | SYSTOLIC BLOOD PRESSURE: 144 MMHG | WEIGHT: 162.04 LBS | RESPIRATION RATE: 16 BRPM | HEART RATE: 95 BPM | HEIGHT: 66 IN | BODY MASS INDEX: 26.04 KG/M2 | OXYGEN SATURATION: 98 %

## 2018-08-26 LAB — HBV E AG SERPL QL IA: NEGATIVE

## 2018-08-26 PROCEDURE — 88304 TISSUE EXAM BY PATHOLOGIST: CPT | Performed by: SURGERY

## 2018-08-26 PROCEDURE — 25000131 ZZH RX MED GY IP 250 OP 636 PS 637: Performed by: ANESTHESIOLOGY

## 2018-08-26 PROCEDURE — 36000093 ZZH SURGERY LEVEL 4 1ST 30 MIN: Performed by: SURGERY

## 2018-08-26 PROCEDURE — 25000128 H RX IP 250 OP 636: Performed by: SURGERY

## 2018-08-26 PROCEDURE — 40000169 ZZH STATISTIC PRE-PROCEDURE ASSESSMENT I: Performed by: SURGERY

## 2018-08-26 PROCEDURE — 71000012 ZZH RECOVERY PHASE 1 LEVEL 1 FIRST HR: Performed by: SURGERY

## 2018-08-26 PROCEDURE — 37000009 ZZH ANESTHESIA TECHNICAL FEE, EACH ADDTL 15 MIN: Performed by: SURGERY

## 2018-08-26 PROCEDURE — 25000128 H RX IP 250 OP 636: Performed by: ANESTHESIOLOGY

## 2018-08-26 PROCEDURE — 27210995 ZZH RX 272: Performed by: SURGERY

## 2018-08-26 PROCEDURE — 25000128 H RX IP 250 OP 636: Performed by: NURSE ANESTHETIST, CERTIFIED REGISTERED

## 2018-08-26 PROCEDURE — 47562 LAPAROSCOPIC CHOLECYSTECTOMY: CPT | Mod: AS | Performed by: PHYSICIAN ASSISTANT

## 2018-08-26 PROCEDURE — 25000566 ZZH SEVOFLURANE, EA 15 MIN: Performed by: SURGERY

## 2018-08-26 PROCEDURE — 25000125 ZZHC RX 250: Performed by: SURGERY

## 2018-08-26 PROCEDURE — 25000132 ZZH RX MED GY IP 250 OP 250 PS 637: Performed by: INTERNAL MEDICINE

## 2018-08-26 PROCEDURE — 25000125 ZZHC RX 250: Performed by: NURSE ANESTHETIST, CERTIFIED REGISTERED

## 2018-08-26 PROCEDURE — 71000013 ZZH RECOVERY PHASE 1 LEVEL 1 EA ADDTL HR: Performed by: SURGERY

## 2018-08-26 PROCEDURE — 27210794 ZZH OR GENERAL SUPPLY STERILE: Performed by: SURGERY

## 2018-08-26 PROCEDURE — 37000008 ZZH ANESTHESIA TECHNICAL FEE, 1ST 30 MIN: Performed by: SURGERY

## 2018-08-26 PROCEDURE — 47562 LAPAROSCOPIC CHOLECYSTECTOMY: CPT | Performed by: SURGERY

## 2018-08-26 PROCEDURE — 88304 TISSUE EXAM BY PATHOLOGIST: CPT | Mod: 26 | Performed by: SURGERY

## 2018-08-26 PROCEDURE — 25800025 ZZH RX 258: Performed by: SURGERY

## 2018-08-26 PROCEDURE — 36000063 ZZH SURGERY LEVEL 4 EA 15 ADDTL MIN: Performed by: SURGERY

## 2018-08-26 PROCEDURE — 99238 HOSP IP/OBS DSCHRG MGMT 30/<: CPT | Performed by: INTERNAL MEDICINE

## 2018-08-26 PROCEDURE — 25000125 ZZHC RX 250: Performed by: INTERNAL MEDICINE

## 2018-08-26 PROCEDURE — 25000128 H RX IP 250 OP 636: Performed by: INTERNAL MEDICINE

## 2018-08-26 PROCEDURE — 25000132 ZZH RX MED GY IP 250 OP 250 PS 637: Performed by: PHYSICIAN ASSISTANT

## 2018-08-26 PROCEDURE — 0FT44ZZ RESECTION OF GALLBLADDER, PERCUTANEOUS ENDOSCOPIC APPROACH: ICD-10-PCS | Performed by: SURGERY

## 2018-08-26 RX ORDER — HYDROMORPHONE HYDROCHLORIDE 1 MG/ML
.3-.5 INJECTION, SOLUTION INTRAMUSCULAR; INTRAVENOUS; SUBCUTANEOUS EVERY 5 MIN PRN
Status: DISCONTINUED | OUTPATIENT
Start: 2018-08-26 | End: 2018-08-26 | Stop reason: HOSPADM

## 2018-08-26 RX ORDER — LIDOCAINE HYDROCHLORIDE 20 MG/ML
INJECTION, SOLUTION INFILTRATION; PERINEURAL PRN
Status: DISCONTINUED | OUTPATIENT
Start: 2018-08-26 | End: 2018-08-26

## 2018-08-26 RX ORDER — PROPOFOL 10 MG/ML
INJECTION, EMULSION INTRAVENOUS PRN
Status: DISCONTINUED | OUTPATIENT
Start: 2018-08-26 | End: 2018-08-26

## 2018-08-26 RX ORDER — CEFAZOLIN SODIUM 2 G/100ML
2 INJECTION, SOLUTION INTRAVENOUS
Status: COMPLETED | OUTPATIENT
Start: 2018-08-26 | End: 2018-08-26

## 2018-08-26 RX ORDER — ONDANSETRON 2 MG/ML
4 INJECTION INTRAMUSCULAR; INTRAVENOUS EVERY 30 MIN PRN
Status: DISCONTINUED | OUTPATIENT
Start: 2018-08-26 | End: 2018-08-26 | Stop reason: HOSPADM

## 2018-08-26 RX ORDER — FENTANYL CITRATE 50 UG/ML
INJECTION, SOLUTION INTRAMUSCULAR; INTRAVENOUS PRN
Status: DISCONTINUED | OUTPATIENT
Start: 2018-08-26 | End: 2018-08-26

## 2018-08-26 RX ORDER — ONDANSETRON 4 MG/1
4 TABLET, ORALLY DISINTEGRATING ORAL EVERY 30 MIN PRN
Status: DISCONTINUED | OUTPATIENT
Start: 2018-08-26 | End: 2018-08-26 | Stop reason: HOSPADM

## 2018-08-26 RX ORDER — NALOXONE HYDROCHLORIDE 0.4 MG/ML
.1-.4 INJECTION, SOLUTION INTRAMUSCULAR; INTRAVENOUS; SUBCUTANEOUS
Status: DISCONTINUED | OUTPATIENT
Start: 2018-08-26 | End: 2018-08-26

## 2018-08-26 RX ORDER — OXYCODONE HYDROCHLORIDE 5 MG/1
5-10 TABLET ORAL EVERY 4 HOURS PRN
Status: DISCONTINUED | OUTPATIENT
Start: 2018-08-26 | End: 2018-08-26 | Stop reason: HOSPADM

## 2018-08-26 RX ORDER — MAGNESIUM HYDROXIDE 1200 MG/15ML
LIQUID ORAL PRN
Status: DISCONTINUED | OUTPATIENT
Start: 2018-08-26 | End: 2018-08-26 | Stop reason: HOSPADM

## 2018-08-26 RX ORDER — SODIUM CHLORIDE, SODIUM LACTATE, POTASSIUM CHLORIDE, CALCIUM CHLORIDE 600; 310; 30; 20 MG/100ML; MG/100ML; MG/100ML; MG/100ML
INJECTION, SOLUTION INTRAVENOUS CONTINUOUS
Status: DISCONTINUED | OUTPATIENT
Start: 2018-08-26 | End: 2018-08-26 | Stop reason: HOSPADM

## 2018-08-26 RX ORDER — FENTANYL CITRATE 50 UG/ML
25-50 INJECTION, SOLUTION INTRAMUSCULAR; INTRAVENOUS
Status: DISCONTINUED | OUTPATIENT
Start: 2018-08-26 | End: 2018-08-26 | Stop reason: HOSPADM

## 2018-08-26 RX ORDER — DEXAMETHASONE SODIUM PHOSPHATE 4 MG/ML
INJECTION, SOLUTION INTRA-ARTICULAR; INTRALESIONAL; INTRAMUSCULAR; INTRAVENOUS; SOFT TISSUE PRN
Status: DISCONTINUED | OUTPATIENT
Start: 2018-08-26 | End: 2018-08-26

## 2018-08-26 RX ORDER — GLYCOPYRROLATE 0.2 MG/ML
INJECTION, SOLUTION INTRAMUSCULAR; INTRAVENOUS PRN
Status: DISCONTINUED | OUTPATIENT
Start: 2018-08-26 | End: 2018-08-26

## 2018-08-26 RX ORDER — NEOSTIGMINE METHYLSULFATE 1 MG/ML
VIAL (ML) INJECTION PRN
Status: DISCONTINUED | OUTPATIENT
Start: 2018-08-26 | End: 2018-08-26

## 2018-08-26 RX ORDER — OXYCODONE HYDROCHLORIDE 5 MG/1
5-10 TABLET ORAL EVERY 4 HOURS PRN
Qty: 20 TABLET | Refills: 0 | Status: SHIPPED | OUTPATIENT
Start: 2018-08-26

## 2018-08-26 RX ORDER — BUPIVACAINE HYDROCHLORIDE AND EPINEPHRINE 2.5; 5 MG/ML; UG/ML
INJECTION, SOLUTION INFILTRATION; PERINEURAL PRN
Status: DISCONTINUED | OUTPATIENT
Start: 2018-08-26 | End: 2018-08-26 | Stop reason: HOSPADM

## 2018-08-26 RX ORDER — APREPITANT 40 MG/1
40 CAPSULE ORAL ONCE
Status: COMPLETED | OUTPATIENT
Start: 2018-08-26 | End: 2018-08-26

## 2018-08-26 RX ORDER — CEFAZOLIN SODIUM 1 G/3ML
1 INJECTION, POWDER, FOR SOLUTION INTRAMUSCULAR; INTRAVENOUS SEE ADMIN INSTRUCTIONS
Status: DISCONTINUED | OUTPATIENT
Start: 2018-08-26 | End: 2018-08-26 | Stop reason: HOSPADM

## 2018-08-26 RX ORDER — ONDANSETRON 2 MG/ML
INJECTION INTRAMUSCULAR; INTRAVENOUS PRN
Status: DISCONTINUED | OUTPATIENT
Start: 2018-08-26 | End: 2018-08-26

## 2018-08-26 RX ADMIN — LIDOCAINE HYDROCHLORIDE 100 MG: 20 INJECTION, SOLUTION INFILTRATION; PERINEURAL at 08:13

## 2018-08-26 RX ADMIN — Medication 0.5 MG: at 10:21

## 2018-08-26 RX ADMIN — APREPITANT 40 MG: 40 CAPSULE ORAL at 08:02

## 2018-08-26 RX ADMIN — PANTOPRAZOLE SODIUM 40 MG: 40 INJECTION, POWDER, FOR SOLUTION INTRAVENOUS at 07:10

## 2018-08-26 RX ADMIN — SODIUM CHLORIDE, POTASSIUM CHLORIDE, SODIUM LACTATE AND CALCIUM CHLORIDE: 600; 310; 30; 20 INJECTION, SOLUTION INTRAVENOUS at 07:39

## 2018-08-26 RX ADMIN — SENNOSIDES AND DOCUSATE SODIUM 2 TABLET: 8.6; 5 TABLET ORAL at 15:48

## 2018-08-26 RX ADMIN — FENTANYL CITRATE 50 MCG: 50 INJECTION INTRAMUSCULAR; INTRAVENOUS at 09:55

## 2018-08-26 RX ADMIN — OXYCODONE HYDROCHLORIDE 5 MG: 5 TABLET ORAL at 14:58

## 2018-08-26 RX ADMIN — CEFAZOLIN SODIUM 2 G: 2 INJECTION, SOLUTION INTRAVENOUS at 08:07

## 2018-08-26 RX ADMIN — OXYCODONE HYDROCHLORIDE 5 MG: 5 TABLET ORAL at 14:23

## 2018-08-26 RX ADMIN — NEOSTIGMINE METHYLSULFATE 5 MG: 1 INJECTION, SOLUTION INTRAVENOUS at 08:49

## 2018-08-26 RX ADMIN — FENTANYL CITRATE 50 MCG: 50 INJECTION, SOLUTION INTRAMUSCULAR; INTRAVENOUS at 08:07

## 2018-08-26 RX ADMIN — BUSPIRONE HYDROCHLORIDE 5 MG: 5 TABLET ORAL at 13:49

## 2018-08-26 RX ADMIN — MIDAZOLAM 2 MG: 1 INJECTION INTRAMUSCULAR; INTRAVENOUS at 08:07

## 2018-08-26 RX ADMIN — HYDROMORPHONE HYDROCHLORIDE 0.25 MG: 1 INJECTION, SOLUTION INTRAMUSCULAR; INTRAVENOUS; SUBCUTANEOUS at 08:57

## 2018-08-26 RX ADMIN — ONDANSETRON 4 MG: 4 TABLET, ORALLY DISINTEGRATING ORAL at 05:45

## 2018-08-26 RX ADMIN — SODIUM CHLORIDE, POTASSIUM CHLORIDE, SODIUM LACTATE AND CALCIUM CHLORIDE: 600; 310; 30; 20 INJECTION, SOLUTION INTRAVENOUS at 09:21

## 2018-08-26 RX ADMIN — DEXAMETHASONE SODIUM PHOSPHATE 4 MG: 4 INJECTION, SOLUTION INTRA-ARTICULAR; INTRALESIONAL; INTRAMUSCULAR; INTRAVENOUS; SOFT TISSUE at 08:23

## 2018-08-26 RX ADMIN — ESCITALOPRAM 10 MG: 10 TABLET, FILM COATED ORAL at 13:49

## 2018-08-26 RX ADMIN — ROCURONIUM BROMIDE 40 MG: 10 INJECTION INTRAVENOUS at 08:14

## 2018-08-26 RX ADMIN — SODIUM CHLORIDE: 9 INJECTION, SOLUTION INTRAVENOUS at 02:13

## 2018-08-26 RX ADMIN — ONDANSETRON 4 MG: 2 INJECTION INTRAMUSCULAR; INTRAVENOUS at 08:49

## 2018-08-26 RX ADMIN — MORPHINE SULFATE 2 MG: 2 INJECTION, SOLUTION INTRAMUSCULAR; INTRAVENOUS at 11:10

## 2018-08-26 RX ADMIN — FENTANYL CITRATE 50 MCG: 50 INJECTION INTRAMUSCULAR; INTRAVENOUS at 09:32

## 2018-08-26 RX ADMIN — ROCURONIUM BROMIDE 10 MG: 10 INJECTION INTRAVENOUS at 08:24

## 2018-08-26 RX ADMIN — BUPROPION HYDROCHLORIDE 150 MG: 150 TABLET, FILM COATED, EXTENDED RELEASE ORAL at 13:49

## 2018-08-26 RX ADMIN — MORPHINE SULFATE 2 MG: 2 INJECTION, SOLUTION INTRAMUSCULAR; INTRAVENOUS at 11:47

## 2018-08-26 RX ADMIN — Medication 0.5 MG: at 09:18

## 2018-08-26 RX ADMIN — HYDROMORPHONE HYDROCHLORIDE 0.25 MG: 1 INJECTION, SOLUTION INTRAMUSCULAR; INTRAVENOUS; SUBCUTANEOUS at 08:40

## 2018-08-26 RX ADMIN — FENTANYL CITRATE 50 MCG: 50 INJECTION, SOLUTION INTRAMUSCULAR; INTRAVENOUS at 08:13

## 2018-08-26 RX ADMIN — PROPOFOL 200 MG: 10 INJECTION, EMULSION INTRAVENOUS at 08:13

## 2018-08-26 RX ADMIN — GLYCOPYRROLATE 0.8 MG: 0.2 INJECTION, SOLUTION INTRAMUSCULAR; INTRAVENOUS at 08:49

## 2018-08-26 ASSESSMENT — LIFESTYLE VARIABLES: TOBACCO_USE: 0

## 2018-08-26 ASSESSMENT — ACTIVITIES OF DAILY LIVING (ADL)
ADLS_ACUITY_SCORE: 11

## 2018-08-26 NOTE — PLAN OF CARE
"Problem: Patient Care Overview  Goal: Plan of Care/Patient Progress Review  Outcome: Improving  [facility]  Received MS 2 mg x 2; last dose 1147; now pain is a \"2\", soreness and is up ambulated in skaggs independently.  Tolerated clear liquids; advanced to full liquids.  Bowel sounds positive.  Very slight ooze of blood on the bandaid on the umbilical incision.  Will give oral pain meds on next need.        "

## 2018-08-26 NOTE — OP NOTE
General Surgery Operative Note    PREOPERATIVE DIAGNOSIS:  Acute cholecystitis    POSTOPERATIVE DIAGNOSIS:  Acute Cholecytitis    PROCEDURE:  LAPAROSCOPIC CHOLECYSTECTOMY    ANESTHESIA:  General.    PREOPERATIVE MEDICATIONS:  Ancef IV.    SURGEON:  Moises Tinoco MD    ASSISTANT:  Sony Riley PA-C  A first assistant was necessary owing to challenging laparoscopic visualization and exposure.  Retraction was also necessary.    INDICATIONS:  Pt with RUQ pain, nausea.  Gallstones.    PROCEDURE:  The patient was taken to the operating suite and uneventfully endotracheally intubated.  The abdomen was prepped and draped in a sterile fashion.  Surgeon initiated timeout was acknowledged.  We entered the abdomen in the left upper quadrant using Visiport technique.  Two other trocars were placed under laparoscopic visualization.  We elevated the liver and were able to identify a somewhat inflamed gallbladder.  The gallbladder was grasped and used to elevate the liver further.  We began dissecting out some fatty adhesions down near the neck of the gallbladder until a cystic duct was encountered.  We continued our dissection using combination of sharp and blunt dissection until the cystic duct was largely dissected out.  We continued our dissection up along the sides of the gallbladder, both medially and laterally, until we had created a space between the gallbladder and the liver.  At this point, we encountered the cystic artery, just posterior and lateral to the cystic duct.  This again was dissected out.  Once we had created a window where only the cystic artery and duct were noted to be entering the gallbladder, we felt that this represented our critical view.  The cystic artery and duct were then doubly clipped and divided.  We continued our dissection up along the body of the gallbladder, freeing all attachments and adhesions of the gallbladder to the liver.  Gallbladder was removed from the liver in an atraumatic  fashion.  The gallbladder was then brought up through the umbilical port site and removed from the abdomen.  The gallbladder fossa was reinspected, and all areas of bleeding were managed with electrocautery.  We irrigated the area with normal saline and aspirated it out.  We then removed the umbilical port trocar and closed the fascia with a figure-of-eight 0 Vicryl suture.  This was done using the Rock-Meaghan device.  We then reinspected the abdomen, and everything appeared to be in pristine condition.  We removed the trocars under laparoscopic visualization and desufflated the abdomen with the [place] suction .  The skin edges were reapproximated with 4-0 Vicryl and Steri-Strips.  The patient was uneventfully extubated, awakened and taken to the PACU in stable condition.  At the conclusion of the case, all lap and needle counts were correct.      ESTIMATED BLOOD LOSS:  5 mL    INTRAOPERATIVE FINDINGS:  Minimally inflamed gallbladder with stones.    Moises Tinoco MD, MD

## 2018-08-26 NOTE — PLAN OF CARE
Problem: Patient Care Overview  Goal: Plan of Care/Patient Progress Review  Outcome: No Change  A&Ox4, VSS on RA. Pain managed with IV Morphine x2 this shift with improvement. MRCP completed, unremarkable. Placed on Regular diet with plans to resume NPO after midnight. Zofran x1 for nausea with relief. +BS. IVF@100/hr. Up indpendently. Declined Buspar @HS, states will keep him up. Plans for Lap Laurie @0800.

## 2018-08-26 NOTE — ANESTHESIA CARE TRANSFER NOTE
Patient: Babs Monahan    Procedure(s):   Laparoscopic Cholecystectomy  - Wound Class: II-Clean Contaminated    Diagnosis: unknown  Diagnosis Additional Information: No value filed.    Anesthesia Type:   General, ETT     Note:  Airway :Face Mask  Patient transferred to:PACU  Comments: Report to PACU RNHandoff Report: Identifed the Patient, Identified the Reponsible Provider, Reviewed the pertinent medical history, Discussed the surgical course, Reviewed Intra-OP anesthesia mangement and issues during anesthesia, Set expectations for post-procedure period and Allowed opportunity for questions and acknowledgement of understanding      Vitals: (Last set prior to Anesthesia Care Transfer)    CRNA VITALS  8/26/2018 0827 - 8/26/2018 0906      8/26/2018             NIBP: (!)  151/134    NIBP Mean: 143                Electronically Signed By: MIGUELITO Ignacio CRNA  August 26, 2018  9:06 AM

## 2018-08-26 NOTE — PLAN OF CARE
Problem: Patient Care Overview  Goal: Plan of Care/Patient Progress Review  Outcome: Adequate for Discharge Date Met: 08/26/18  Discharge    Patient discharged to home via personal vehicle with family to transport    Care plan note  A&Ox4, VSS on RA. Rates pain at 2/10, tolerable per pt. +BS, +flatus. Denies N/V. Lap sites CDI x2, scant sanguinous drainage at umbilical site. Tolerates full liquid diet. Up independently, voiding adequately. All dc instructions reviewed with pt, questions answered. Verbalizes understanding. Pain prescription filled and medication sent with pt. All belongings with pt.    Listed belongings gathered and returned to patient. Yes  Care Plan and Patient education resolved: Yes  Prescriptions if needed, hard copies sent with patient  Yes  Home and hospital acquired medications returned to patient: NA  Medication Bin checked and emptied on discharge Yes  Follow up appointment made for patient: Pt to schedule follow up with PCP within 7 days. Dr. Tinoco's clinic will contact pt within 2 weeks to follow up.

## 2018-08-26 NOTE — PLAN OF CARE
Problem: Patient Care Overview  Goal: Plan of Care/Patient Progress Review  Outcome: No Change  Pt a/o x4. Up ad bogdan independent to BR. NPO since midnight for lap vaughn surgery at 0800 today. VSS on RA. Denies nausea and pain. IVF  ml/h continuous patent. Discharge pending progress, will continue to monitor.     0630 Pt c/o nausea, given zofran PO with some improvement. Report given to PACU.

## 2018-08-26 NOTE — BRIEF OP NOTE
[facility] Brief Operative Note    Pre-operative diagnosis: Abdominal Pain   Post-operative diagnosis Acute Cholecytitis     Procedure: Procedure(s):   Laparoscopic Cholecystectomy  - Wound Class: II-Clean Contaminated   Surgeon(s): Surgeon(s) and Role:     * Moises Tinoco MD - Primary     * Sony Riley PA-C - Assisting   Estimated blood loss: 10 mL    Specimens:   ID Type Source Tests Collected by Time Destination   A : GALLBLADDER AND CONTENTS Tissue Gallbladder and Contents SURGICAL PATHOLOGY EXAM Moises Tinoco MD 8/26/2018  8:44 AM       Findings: See Operative Report for full details.  No complications noted.

## 2018-08-26 NOTE — ANESTHESIA PREPROCEDURE EVALUATION
Procedure: Procedure(s):  LAPAROSCOPIC CHOLECYSTECTOMY WITH CHOLANGIOGRAMS  Preop diagnosis: unknown    No Known Allergies  No past medical history on file.  No past surgical history on file.  Social History   Substance Use Topics     Smoking status: Not on file     Smokeless tobacco: Not on file     Alcohol use Not on file     Prior to Admission medications    Medication Sig Start Date End Date Taking? Authorizing Provider   buPROPion (WELLBUTRIN XL) 150 MG 24 hr tablet Take 150 mg by mouth every morning   Yes Unknown, Entered By History   busPIRone (BUSPAR) 5 MG tablet Take 5 mg by mouth 3 times daily   Yes Unknown, Entered By History   escitalopram (LEXAPRO) 10 MG tablet Take 10 mg by mouth daily   Yes Unknown, Entered By History   Levomefolate Glucosamine (METHYLFOLATE PO)    Yes Unknown, Entered By History     Current Facility-Administered Medications Ordered in Epic   Medication Dose Route Frequency Last Rate Last Dose     [Auto Hold] acetaminophen (TYLENOL) tablet 650 mg  650 mg Oral Q4H PRN         [Auto Hold] bisacodyl (DULCOLAX) Suppository 10 mg  10 mg Rectal Daily PRN         [Auto Hold] buPROPion (WELLBUTRIN XL) 24 hr tablet 150 mg  150 mg Oral QAM   150 mg at 08/25/18 1153     [Auto Hold] busPIRone (BUSPAR) tablet 5 mg  5 mg Oral TID   5 mg at 08/25/18 1605     ceFAZolin (ANCEF) 1 g vial to attach to  ml bag for ADULT or 50 ml bag for PEDS  1 g Intravenous See Admin Instructions         ceFAZolin (ANCEF) intermittent infusion 2 g in 100 mL dextrose PRE-MIX  2 g Intravenous Pre-Op/Pre-procedure x 1 dose         [Auto Hold] escitalopram (LEXAPRO) tablet 10 mg  10 mg Oral Daily   10 mg at 08/25/18 1153     lactated ringers infusion   Intravenous Continuous 25 mL/hr at 08/26/18 0739       lidocaine 1 % 1 mL  1 mL Other Q1H PRN         [Auto Hold] magnesium hydroxide (MILK OF MAGNESIA) suspension 30 mL  30 mL Oral Daily PRN         [Auto Hold] melatonin tablet 1 mg  1 mg Oral At Bedtime PRN          [Auto Hold] morphine (PF) injection 2-4 mg  2-4 mg Intravenous Q4H PRN   2 mg at 08/25/18 2028     [Auto Hold] naloxone (NARCAN) injection 0.1-0.4 mg  0.1-0.4 mg Intravenous Q2 Min PRN         [Auto Hold] ondansetron (ZOFRAN) injection 4 mg  4 mg Intravenous Q30 Min PRN         [Auto Hold] ondansetron (ZOFRAN-ODT) ODT tab 4 mg  4 mg Oral Q6H PRN   4 mg at 08/26/18 0545    Or     [Auto Hold] ondansetron (ZOFRAN) injection 4 mg  4 mg Intravenous Q6H PRN   4 mg at 08/24/18 2347     [Auto Hold] pantoprazole (PROTONIX) 40 mg IV push injection  40 mg Intravenous Daily with breakfast   40 mg at 08/26/18 0710     [Auto Hold] senna-docusate (SENOKOT-S;PERICOLACE) 8.6-50 MG per tablet 1 tablet  1 tablet Oral BID PRN        Or     [Auto Hold] senna-docusate (SENOKOT-S;PERICOLACE) 8.6-50 MG per tablet 2 tablet  2 tablet Oral BID PRN         [Auto Hold] senna-docusate (SENOKOT-S;PERICOLACE) 8.6-50 MG per tablet 1 tablet  1 tablet Oral BID        Or     [Auto Hold] senna-docusate (SENOKOT-S;PERICOLACE) 8.6-50 MG per tablet 2 tablet  2 tablet Oral BID   2 tablet at 08/25/18 2138     sodium chloride 0.9% infusion  1,000 mL Intravenous Continuous 125 mL/hr at 08/24/18 2201 1,000 mL at 08/24/18 2201     sodium chloride 0.9% infusion   Intravenous Continuous 100 mL/hr at 08/26/18 0213       No current Epic-ordered outpatient prescriptions on file.       lactated ringers 25 mL/hr at 08/26/18 0739     sodium chloride 1,000 mL (08/24/18 2201)     sodium chloride 100 mL/hr at 08/26/18 0213     Wt Readings from Last 1 Encounters:   08/26/18 73.5 kg (162 lb 0.6 oz)     Temp Readings from Last 1 Encounters:   08/26/18 36.3  C (97.4  F) (Temporal)     BP Readings from Last 6 Encounters:   08/26/18 128/74     Pulse Readings from Last 4 Encounters:   08/25/18 71     Resp Readings from Last 1 Encounters:   08/26/18 16     SpO2 Readings from Last 1 Encounters:   08/26/18 95%     Recent Labs   Lab Test  08/25/18   0940   NA  143   POTASSIUM  4.0    CHLORIDE  108   CO2  29   ANIONGAP  6   GLC  101*   BUN  8   CR  0.93   MARY  8.8     Recent Labs   Lab Test  08/25/18   0940   AST  291*   ALT  761*   ALKPHOS  170*   BILITOTAL  1.5*     Recent Labs   Lab Test  08/25/18   0940   WBC  6.3   HGB  15.6   PLT  218     No results for input(s): ABO, RH in the last 22771 hours.  Recent Labs   Lab Test  08/25/18   0940   INR  1.02      No results for input(s): TROPI in the last 26775 hours.  No results for input(s): PH, PCO2, PO2, HCO3 in the last 13894 hours.  No results for input(s): HCG in the last 56674 hours.  Recent Results (from the past 744 hour(s))   Abdomen US, limited (RUQ only)    Narrative    US ABDOMEN LIMITED  8/24/2018  9:24 PM     HISTORY: Elevated LFTs, right upper quadrant pain, rule out  cholecystitis, same.    COMPARISON: None.    FINDINGS: The liver is normal in size and texture without focal mass.  There is no intra or extrahepatic biliary dilatation. The common  hepatic duct measures 0.3 cm. There are multiple stones in the  gallbladder. The gallbladder wall is mildly thickened at 0.5 cm. There  is a positive sonographic Thakkar's sign. The pancreas head and body  appear normal. The tail is obscured by bowel gas. The right kidney  measures 10.1 cm and is normal in appearance.       Impression    IMPRESSION:    1. Cholelithiasis. There is gallbladder wall thickening and a positive  sonographic Thakkar's sign. This may be acute cholecystitis.  2. No biliary dilatation.    LILIANA SAVAGE MD   MR Abdomen MRCP w/o & w Contrast    Narrative    MAGNETIC RESONANCE CHOLANGIOPANCREATOGRAPHY  8/25/2018 4:59 PM     HISTORY: Evaluate for ductal stones.     COMPARISON: Ultrasound May 24, 2018    TECHNIQUE: Multiplanar, multisequence images of the abdomen acquired  before and after administration of 10 mL Gadavist intravenous  contrast.    FINDINGS: There is cholelithiasis. There is no significant gallbladder  wall thickening. There is no intra or extrahepatic  biliary dilatation.  There is no choledocholithiasis. No biliary strictures. The pancreatic  duct is not dilated. No pancreatic duct strictures. No significant  sidebranch visualization. No cystic lesions within the pancreas. The  signal intensity of the liver is within normal limits without evidence  for hepatic steatosis. The signal intensity of the pancreas within  normal limits without evidence for pancreatitis. Visualized kidneys  demonstrate unremarkable signal intensity without hydronephrosis.  Adrenal glands and spleen are unremarkable. Visualized osseous  structures unremarkable. After administration of intravenous contrast,  solid organs demonstrate no enhancing focal lesions.      Impression    IMPRESSION:   1. No evidence for choledocholithiasis.  2. No evidence for cholelithiasis.    AARTI RDZ MD       RECENT LABS:   ECG:   ECHO:     Anesthesia Evaluation     . Pt has had prior anesthetic.     History of anesthetic complications   - PONV        ROS/MED HX    ENT/Pulmonary:      (-) tobacco use   Neurologic:       Cardiovascular:         METS/Exercise Tolerance:     Hematologic:         Musculoskeletal:         GI/Hepatic:     (+) cholecystitis/cholelithiasis,       Renal/Genitourinary:         Endo:         Psychiatric:     (+) psychiatric history anxiety and depression      Infectious Disease:         Malignancy:         Other:                     Physical Exam  Normal systems: cardiovascular, pulmonary and dental    Airway   Mallampati: I  Neck ROM: full    Dental   (+) implants    Cardiovascular       Pulmonary                     Anesthesia Plan      History & Physical Review  History and physical reviewed and following examination; no interval change.    ASA Status:  2 .    NPO Status:  > 8 hours    Plan for General and ETT with Intravenous induction. Maintenance will be Balanced.    PONV prophylaxis:  Ondansetron (or other 5HT-3)  Additional equipment: Videolaryngoscope      Postoperative  Care  Postoperative pain management:  IV analgesics.      Consents  Anesthetic plan, risks, benefits and alternatives discussed with:  Patient..                          .

## 2018-08-26 NOTE — DISCHARGE SUMMARY
[facility]  Discharge Summary        Babs Monahan MRN# [id number]   YOB: 1995 Age: 23 year old     Date of Admission:  8/24/2018  Date of Discharge:  8/26/2018  Admitting Physician:  Hero Warner MD  Discharge Physician: Hero Warner MD  Discharging Service: Hospitalist     Primary Provider:  No Ref-Primary, Physician  Primary Care Physician Phone Number: None         Discharge Diagnoses/Problem Oriented Hospital Course (Providers):    Babs Monahan was admitted on 8/24/2018 by Hero Warner MD and I would refer you to their history and physical.  The following problems were addressed during his hospitalization:        ASSESSMENT:  Babs Monahan is a 23-year-old with a history of binge drinking, 6 drinks of alcohol about 3 days ago, which started the patient having episodes of nausea and vomiting and now finding of abnormalities including elevation of AST, ALT and total bilirubin, but normal lipase, and ultrasound evidence of acute cholecystitis with cholelithiasis and positive sonographic Thakkar sign, but no biliary dilatation.       PLAN:   1.  Acute cholecystitis s/p lap vaughn.    - AST/ALT improving as compared to outside hospital values.  - await MRCP negative for stone  - s/p lap vaughn   - tolerating diet  - ok to go home  2.  Depression, anxiety.    - continue the patient on his medications including Lexapro, BuSpar and Wellbutrin.   3.  History of reflux disease.   - continue with PPI         CODE STATUS:  FULL.          Code Status:      Full Code         Important Results:      NAD  Lungs cta  cv rrr  abd mild abdominal soreness and mild distention  Ext  No edema         Pending Results:        Unresulted Labs Ordered in the Past 30 Days of this Admission     Date and Time Order Name Status Description    8/25/2018 0500 Hepatitis B core antibody IgM In process     8/25/2018 0500 Hepatitis B Surface Antibody In process     8/24/2018 2041 Hepatitis E Antibody  IgM In process     8/24/2018 2041 Hepatitis A antibody IgM In process     8/24/2018 2035 Hepatitis C antibody In process                Discharge Instructions and Follow-Up:      Follow-up Appointments     Follow-up and recommended labs and tests       Our office will contact you within 2 weeks to check on your progress and   answer any questions you may have.  If you are doing well, you will not   need to return for a follow up appointment.  If any concerns are   identified over the phone, we may ask you to make an appointment to see a   provider in our clinic.   If you have not received a phone call, have any questions or concerns, or   would like to be seen, please call us at [phone] and ask to speak   with our nurse.  We are located at [address] [place] [address].            Follow-up and recommended labs and tests        Follow up with Dr Degroot as needed.                         Discharge Disposition:      Discharged to home         Discharge Medications:        Current Discharge Medication List      START taking these medications    Details   oxyCODONE IR (ROXICODONE) 5 MG tablet Take 1-2 tablets (5-10 mg) by mouth every 4 hours as needed for pain  Qty: 20 tablet, Refills: 0    Associated Diagnoses: Acute cholecystitis; Elevated LFTs; Acute post-operative pain         CONTINUE these medications which have NOT CHANGED    Details   buPROPion (WELLBUTRIN XL) 150 MG 24 hr tablet Take 150 mg by mouth every morning      busPIRone (BUSPAR) 5 MG tablet Take 5 mg by mouth 3 times daily      escitalopram (LEXAPRO) 10 MG tablet Take 10 mg by mouth daily      Levomefolate Glucosamine (METHYLFOLATE PO)                   Allergies:       No Known Allergies         Consultations This Hospital Stay:      Consultation during this admission received from surgery          Discharge Orders for Skilled Facility (from Discharge Orders):        After Care Instructions     Activity       Your activity  upon discharge: activity as tolerated, ambulate in house, no driving while on analgesics and no heavy lifting for 2 weeks            Activity       Your activity upon discharge: activity as tolerated            Diet       Follow this diet upon discharge: Advance to a regular diet as tolerated            Diet       Follow this diet upon discharge: Orders Placed This Encounter     Low fat diet            May discharge when       Discharge to home when the following criteria have been met:    Stable vital signs    Oral temperature < 100 o F    Return to baseline mental status    No bleeding at incision site    Able to tolerate oral fluids    Minimal pain reported and/or controlled with oral analgesics    Minimal nausea    Ambulates with assistance appropriate to age and health status  HOSPITALIST APPROVAL            Wound care and dressings       Instructions to care for your wound at home: may get incision wet in shower but do not soak or scrub.                           Rehab orders for Skilled Facility (from Discharge Orders):               Discharge Time:      Less than 30 minutes.        Image Results From This Hospital Stay (For Non-EPIC Providers):        Results for orders placed or performed during the hospital encounter of 08/24/18   Abdomen US, limited (RUQ only)    Narrative    US ABDOMEN LIMITED  8/24/2018  9:24 PM     HISTORY: Elevated LFTs, right upper quadrant pain, rule out  cholecystitis, same.    COMPARISON: None.    FINDINGS: The liver is normal in size and texture without focal mass.  There is no intra or extrahepatic biliary dilatation. The common  hepatic duct measures 0.3 cm. There are multiple stones in the  gallbladder. The gallbladder wall is mildly thickened at 0.5 cm. There  is a positive sonographic Thakkar's sign. The pancreas head and body  appear normal. The tail is obscured by bowel gas. The right kidney  measures 10.1 cm and is normal in appearance.       Impression     IMPRESSION:    1. Cholelithiasis. There is gallbladder wall thickening and a positive  sonographic Thakkar's sign. This may be acute cholecystitis.  2. No biliary dilatation.    LILIANA SAVAGE MD   MR Abdomen MRCP w/o & w Contrast    Narrative    MAGNETIC RESONANCE CHOLANGIOPANCREATOGRAPHY  8/25/2018 4:59 PM     HISTORY: Evaluate for ductal stones.     COMPARISON: Ultrasound May 24, 2018    TECHNIQUE: Multiplanar, multisequence images of the abdomen acquired  before and after administration of 10 mL Gadavist intravenous  contrast.    FINDINGS: There is cholelithiasis. There is no significant gallbladder  wall thickening. There is no intra or extrahepatic biliary dilatation.  There is no choledocholithiasis. No biliary strictures. The pancreatic  duct is not dilated. No pancreatic duct strictures. No significant  sidebranch visualization. No cystic lesions within the pancreas. The  signal intensity of the liver is within normal limits without evidence  for hepatic steatosis. The signal intensity of the pancreas within  normal limits without evidence for pancreatitis. Visualized kidneys  demonstrate unremarkable signal intensity without hydronephrosis.  Adrenal glands and spleen are unremarkable. Visualized osseous  structures unremarkable. After administration of intravenous contrast,  solid organs demonstrate no enhancing focal lesions.      Impression    IMPRESSION:   1. No evidence for choledocholithiasis.  2. No evidence for cholelithiasis.    AARTI RDZ MD           Most Recent Lab Results In EPIC (For Non-EPIC Providers):    Most Recent 3 CBC's:  Recent Labs   Lab Test  08/25/18   0940   WBC  6.3   HGB  15.6   MCV  91   PLT  218      Most Recent 3 BMP's:  Recent Labs   Lab Test  08/25/18   0940   NA  143   POTASSIUM  4.0   CHLORIDE  108   CO2  29   BUN  8   CR  0.93   ANIONGAP  6   MARY  8.8   GLC  101*     Most Recent 3 Troponin's:No lab results found.    Invalid input(s): TROP, TROPONINIES  Most Recent 3  INR's:  Recent Labs   Lab Test  08/25/18   0940   INR  1.02     Most Recent 2 LFT's:  Recent Labs   Lab Test  08/25/18   0940   AST  291*   ALT  761*   ALKPHOS  170*   BILITOTAL  1.5*     Most Recent Cholesterol Panel:No lab results found.  Most Recent 6 Bacteria Isolates From Any Culture (See EPIC Reports for Culture Details):No lab results found.  Most Recent TSH, T4 and HgbA1c:No lab results found.

## 2018-08-26 NOTE — ANESTHESIA POSTPROCEDURE EVALUATION
Patient: Babs Monahan    Procedure(s):   Laparoscopic Cholecystectomy  - Wound Class: II-Clean Contaminated    Diagnosis:unknown  Diagnosis Additional Information: No value filed.    Anesthesia Type:  General, ETT    Note:  Anesthesia Post Evaluation    Patient location during evaluation: PACU  Patient participation: Able to fully participate in evaluation  Level of consciousness: awake and alert  Pain management: adequate  Airway patency: patent  Cardiovascular status: acceptable  Respiratory status: acceptable  Hydration status: acceptable  PONV: none and controlled     Anesthetic complications: None          Last vitals:  Vitals:    08/26/18 1023 08/26/18 1038 08/26/18 1112   BP:  134/77 (P) 141/83   Pulse:      Resp: 12 18 (P) 18   Temp:  36.8  C (98.2  F) (P) 36.5  C (97.7  F)   SpO2: 97% 98% (P) 95%         Electronically Signed By: Shashi Tong MD  August 26, 2018  11:33 AM

## 2018-08-27 LAB
HAV IGM SERPL QL IA: NONREACTIVE
HBV CORE IGM SERPL QL IA: NONREACTIVE
HBV SURFACE AB SERPL IA-ACNC: 1.89 M[IU]/ML
HCV AB SERPL QL IA: NONREACTIVE

## 2018-08-28 LAB
COPATH REPORT: NORMAL
HEV IGM SER QL: NEGATIVE

## 2018-09-10 ENCOUNTER — TELEPHONE (OUTPATIENT)
Dept: SURGERY | Facility: CLINIC | Age: 23
End: 2018-09-10

## 2018-09-10 NOTE — TELEPHONE ENCOUNTER
[facility]  Post op call note - No Answer    Babs Monahan was called for an update regarding his recovery.  There was no answer and a message was left informing patient to call the office with any questions or concerns or to come in to be seen by a provider.       [facility]

## 2025-08-04 ENCOUNTER — HOSPITAL ENCOUNTER (INPATIENT)
Facility: CLINIC | Age: 30
LOS: 3 days | Discharge: HOME | End: 2025-08-07
Attending: EMERGENCY MEDICINE | Admitting: INTERNAL MEDICINE
Payer: COMMERCIAL

## 2025-08-04 DIAGNOSIS — M65.949 FLEXOR TENOSYNOVITIS OF FINGER: Primary | ICD-10-CM

## 2025-08-04 LAB
ANION GAP SERPL CALCULATED.3IONS-SCNC: 9 MMOL/L (ref 7–15)
BASOPHILS # BLD AUTO: 0 10E3/UL (ref 0–0.2)
BASOPHILS NFR BLD AUTO: 0 %
BUN SERPL-MCNC: 17.3 MG/DL (ref 6–20)
CALCIUM SERPL-MCNC: 9.3 MG/DL (ref 8.8–10.4)
CHLORIDE SERPL-SCNC: 101 MMOL/L (ref 98–107)
CREAT SERPL-MCNC: 0.96 MG/DL (ref 0.67–1.17)
EGFRCR SERPLBLD CKD-EPI 2021: >90 ML/MIN/1.73M2
EOSINOPHIL # BLD AUTO: 0.2 10E3/UL (ref 0–0.7)
EOSINOPHIL NFR BLD AUTO: 2 %
ERYTHROCYTE [DISTWIDTH] IN BLOOD BY AUTOMATED COUNT: 12 % (ref 10–15)
GLUCOSE SERPL-MCNC: 107 MG/DL (ref 70–99)
HCO3 SERPL-SCNC: 27 MMOL/L (ref 22–29)
HCT VFR BLD AUTO: 40.4 % (ref 40–53)
HGB BLD-MCNC: 14.7 G/DL (ref 13.3–17.7)
HOLD SPECIMEN: NORMAL
IMM GRANULOCYTES # BLD: 0 10E3/UL
IMM GRANULOCYTES NFR BLD: 0 %
LACTATE SERPL-SCNC: 1 MMOL/L (ref 0.7–2)
LYMPHOCYTES # BLD AUTO: 1.8 10E3/UL (ref 0.8–5.3)
LYMPHOCYTES NFR BLD AUTO: 14 %
MCH RBC QN AUTO: 31.5 PG (ref 26.5–33)
MCHC RBC AUTO-ENTMCNC: 36.4 G/DL (ref 31.5–36.5)
MCV RBC AUTO: 87 FL (ref 78–100)
MONOCYTES # BLD AUTO: 1.3 10E3/UL (ref 0–1.3)
MONOCYTES NFR BLD AUTO: 10 %
NEUTROPHILS # BLD AUTO: 9.1 10E3/UL (ref 1.6–8.3)
NEUTROPHILS NFR BLD AUTO: 74 %
NRBC # BLD AUTO: 0 10E3/UL
NRBC BLD AUTO-RTO: 0 /100
PLATELET # BLD AUTO: 245 10E3/UL (ref 150–450)
POTASSIUM SERPL-SCNC: 3.9 MMOL/L (ref 3.4–5.3)
RBC # BLD AUTO: 4.66 10E6/UL (ref 4.4–5.9)
SODIUM SERPL-SCNC: 137 MMOL/L (ref 135–145)
WBC # BLD AUTO: 12.4 10E3/UL (ref 4–11)

## 2025-08-04 PROCEDURE — 258N000003 HC RX IP 258 OP 636: Performed by: EMERGENCY MEDICINE

## 2025-08-04 PROCEDURE — 99285 EMERGENCY DEPT VISIT HI MDM: CPT | Mod: 25

## 2025-08-04 PROCEDURE — 85041 AUTOMATED RBC COUNT: CPT | Performed by: EMERGENCY MEDICINE

## 2025-08-04 PROCEDURE — 99285 EMERGENCY DEPT VISIT HI MDM: CPT | Mod: 25 | Performed by: EMERGENCY MEDICINE

## 2025-08-04 PROCEDURE — 250N000011 HC RX IP 250 OP 636: Performed by: EMERGENCY MEDICINE

## 2025-08-04 PROCEDURE — 83605 ASSAY OF LACTIC ACID: CPT | Performed by: EMERGENCY MEDICINE

## 2025-08-04 PROCEDURE — 96374 THER/PROPH/DIAG INJ IV PUSH: CPT

## 2025-08-04 PROCEDURE — 99222 1ST HOSP IP/OBS MODERATE 55: CPT | Performed by: INTERNAL MEDICINE

## 2025-08-04 PROCEDURE — 87040 BLOOD CULTURE FOR BACTERIA: CPT | Performed by: EMERGENCY MEDICINE

## 2025-08-04 PROCEDURE — 36415 COLL VENOUS BLD VENIPUNCTURE: CPT | Performed by: EMERGENCY MEDICINE

## 2025-08-04 PROCEDURE — 120N000001 HC R&B MED SURG/OB

## 2025-08-04 PROCEDURE — 80048 BASIC METABOLIC PNL TOTAL CA: CPT | Performed by: EMERGENCY MEDICINE

## 2025-08-04 RX ORDER — ESCITALOPRAM OXALATE 10 MG/1
10 TABLET ORAL DAILY
Status: DISCONTINUED | OUTPATIENT
Start: 2025-08-05 | End: 2025-08-07 | Stop reason: HOSPADM

## 2025-08-04 RX ORDER — LIDOCAINE 40 MG/G
CREAM TOPICAL
Status: DISCONTINUED | OUTPATIENT
Start: 2025-08-04 | End: 2025-08-07 | Stop reason: HOSPADM

## 2025-08-04 RX ORDER — CALCIUM CARBONATE 500 MG/1
1000 TABLET, CHEWABLE ORAL 4 TIMES DAILY PRN
Status: DISCONTINUED | OUTPATIENT
Start: 2025-08-04 | End: 2025-08-07 | Stop reason: HOSPADM

## 2025-08-04 RX ORDER — AMOXICILLIN 250 MG
1 CAPSULE ORAL 2 TIMES DAILY PRN
Status: DISCONTINUED | OUTPATIENT
Start: 2025-08-04 | End: 2025-08-07 | Stop reason: HOSPADM

## 2025-08-04 RX ORDER — MULTIPLE VITAMINS W/ MINERALS TAB 9MG-400MCG
1 TAB ORAL DAILY
COMMUNITY
End: 2025-08-04

## 2025-08-04 RX ORDER — NALTREXONE HYDROCHLORIDE 50 MG/1
50 TABLET, FILM COATED ORAL DAILY
Status: DISCONTINUED | OUTPATIENT
Start: 2025-08-05 | End: 2025-08-07 | Stop reason: HOSPADM

## 2025-08-04 RX ORDER — KETOROLAC TROMETHAMINE 15 MG/ML
10 INJECTION, SOLUTION INTRAMUSCULAR; INTRAVENOUS ONCE
Status: COMPLETED | OUTPATIENT
Start: 2025-08-04 | End: 2025-08-04

## 2025-08-04 RX ORDER — NALTREXONE HYDROCHLORIDE 50 MG/1
50 TABLET, FILM COATED ORAL DAILY
COMMUNITY

## 2025-08-04 RX ORDER — CEFTRIAXONE 2 G/1
2 INJECTION, POWDER, FOR SOLUTION INTRAMUSCULAR; INTRAVENOUS EVERY 24 HOURS
Status: DISCONTINUED | OUTPATIENT
Start: 2025-08-05 | End: 2025-08-05

## 2025-08-04 RX ORDER — VANCOMYCIN HYDROCHLORIDE 1 G/200ML
1000 INJECTION, SOLUTION INTRAVENOUS EVERY 12 HOURS
Status: DISCONTINUED | OUTPATIENT
Start: 2025-08-05 | End: 2025-08-05

## 2025-08-04 RX ORDER — CEFTRIAXONE 1 G/1
1 INJECTION, POWDER, FOR SOLUTION INTRAMUSCULAR; INTRAVENOUS ONCE
Status: COMPLETED | OUTPATIENT
Start: 2025-08-04 | End: 2025-08-04

## 2025-08-04 RX ORDER — AMOXICILLIN 250 MG
2 CAPSULE ORAL 2 TIMES DAILY PRN
Status: DISCONTINUED | OUTPATIENT
Start: 2025-08-04 | End: 2025-08-07 | Stop reason: HOSPADM

## 2025-08-04 RX ORDER — OXYCODONE HYDROCHLORIDE 5 MG/1
5 TABLET ORAL EVERY 6 HOURS PRN
Refills: 0 | Status: DISCONTINUED | OUTPATIENT
Start: 2025-08-04 | End: 2025-08-07 | Stop reason: HOSPADM

## 2025-08-04 RX ADMIN — KETOROLAC TROMETHAMINE 10 MG: 15 INJECTION, SOLUTION INTRAMUSCULAR; INTRAVENOUS at 17:08

## 2025-08-04 RX ADMIN — VANCOMYCIN HYDROCHLORIDE 1500 MG: 10 INJECTION, POWDER, LYOPHILIZED, FOR SOLUTION INTRAVENOUS at 18:18

## 2025-08-04 RX ADMIN — SODIUM CHLORIDE 1000 ML: 0.9 INJECTION, SOLUTION INTRAVENOUS at 17:07

## 2025-08-04 RX ADMIN — CEFTRIAXONE SODIUM 1 G: 1 INJECTION, POWDER, FOR SOLUTION INTRAMUSCULAR; INTRAVENOUS at 17:48

## 2025-08-04 ASSESSMENT — COLUMBIA-SUICIDE SEVERITY RATING SCALE - C-SSRS
2. HAVE YOU ACTUALLY HAD ANY THOUGHTS OF KILLING YOURSELF IN THE PAST MONTH?: NO
6. HAVE YOU EVER DONE ANYTHING, STARTED TO DO ANYTHING, OR PREPARED TO DO ANYTHING TO END YOUR LIFE?: NO
1. IN THE PAST MONTH, HAVE YOU WISHED YOU WERE DEAD OR WISHED YOU COULD GO TO SLEEP AND NOT WAKE UP?: NO

## 2025-08-04 ASSESSMENT — ACTIVITIES OF DAILY LIVING (ADL)
ADLS_ACUITY_SCORE: 41

## 2025-08-05 LAB
ALBUMIN SERPL BCG-MCNC: 3.8 G/DL (ref 3.5–5.2)
ALP SERPL-CCNC: 70 U/L (ref 40–150)
ALT SERPL W P-5'-P-CCNC: 17 U/L (ref 0–70)
ANION GAP SERPL CALCULATED.3IONS-SCNC: 13 MMOL/L (ref 7–15)
AST SERPL W P-5'-P-CCNC: 28 U/L (ref 0–45)
BASOPHILS # BLD AUTO: 0 10E3/UL (ref 0–0.2)
BASOPHILS NFR BLD AUTO: 0 %
BILIRUB SERPL-MCNC: 0.6 MG/DL
BUN SERPL-MCNC: 12.5 MG/DL (ref 6–20)
CALCIUM SERPL-MCNC: 9.2 MG/DL (ref 8.8–10.4)
CHLORIDE SERPL-SCNC: 105 MMOL/L (ref 98–107)
CREAT SERPL-MCNC: 0.93 MG/DL (ref 0.67–1.17)
CRP SERPL-MCNC: 29.69 MG/L
EGFRCR SERPLBLD CKD-EPI 2021: >90 ML/MIN/1.73M2
EOSINOPHIL # BLD AUTO: 0.1 10E3/UL (ref 0–0.7)
EOSINOPHIL NFR BLD AUTO: 2 %
ERYTHROCYTE [DISTWIDTH] IN BLOOD BY AUTOMATED COUNT: 11.9 % (ref 10–15)
ERYTHROCYTE [SEDIMENTATION RATE] IN BLOOD BY WESTERGREN METHOD: 9 MM/HR (ref 0–15)
GLUCOSE SERPL-MCNC: 109 MG/DL (ref 70–99)
HCO3 SERPL-SCNC: 23 MMOL/L (ref 22–29)
HCT VFR BLD AUTO: 40 % (ref 40–53)
HGB BLD-MCNC: 14.3 G/DL (ref 13.3–17.7)
IMM GRANULOCYTES # BLD: 0 10E3/UL
IMM GRANULOCYTES NFR BLD: 0 %
LYMPHOCYTES # BLD AUTO: 1 10E3/UL (ref 0.8–5.3)
LYMPHOCYTES NFR BLD AUTO: 17 %
MCH RBC QN AUTO: 31 PG (ref 26.5–33)
MCHC RBC AUTO-ENTMCNC: 35.8 G/DL (ref 31.5–36.5)
MCV RBC AUTO: 87 FL (ref 78–100)
MCV RBC AUTO: 87 FL (ref 78–100)
MONOCYTES # BLD AUTO: 0.7 10E3/UL (ref 0–1.3)
MONOCYTES NFR BLD AUTO: 12 %
MRSA DNA SPEC QL NAA+PROBE: NEGATIVE
NEUTROPHILS # BLD AUTO: 4.2 10E3/UL (ref 1.6–8.3)
NEUTROPHILS NFR BLD AUTO: 69 %
NRBC # BLD AUTO: 0 10E3/UL
NRBC BLD AUTO-RTO: 0 /100
PLATELET # BLD AUTO: 191 10E3/UL (ref 150–450)
POTASSIUM SERPL-SCNC: 4.1 MMOL/L (ref 3.4–5.3)
PROT SERPL-MCNC: 6.3 G/DL (ref 6.4–8.3)
RBC # BLD AUTO: 4.62 10E6/UL (ref 4.4–5.9)
SA TARGET DNA: NEGATIVE
SODIUM SERPL-SCNC: 141 MMOL/L (ref 135–145)
URATE SERPL-MCNC: 4.3 MG/DL (ref 3.4–7)
WBC # BLD AUTO: 6.1 10E3/UL (ref 4–11)
WBC # BLD AUTO: 6.1 10E3/UL (ref 4–11)

## 2025-08-05 PROCEDURE — 250N000013 HC RX MED GY IP 250 OP 250 PS 637: Performed by: INTERNAL MEDICINE

## 2025-08-05 PROCEDURE — 120N000001 HC R&B MED SURG/OB

## 2025-08-05 PROCEDURE — 86140 C-REACTIVE PROTEIN: CPT | Performed by: ORTHOPAEDIC SURGERY

## 2025-08-05 PROCEDURE — 85041 AUTOMATED RBC COUNT: CPT | Performed by: INTERNAL MEDICINE

## 2025-08-05 PROCEDURE — 250N000011 HC RX IP 250 OP 636: Performed by: INTERNAL MEDICINE

## 2025-08-05 PROCEDURE — 85652 RBC SED RATE AUTOMATED: CPT | Performed by: ORTHOPAEDIC SURGERY

## 2025-08-05 PROCEDURE — 36415 COLL VENOUS BLD VENIPUNCTURE: CPT | Performed by: INTERNAL MEDICINE

## 2025-08-05 PROCEDURE — 87640 STAPH A DNA AMP PROBE: CPT | Performed by: INTERNAL MEDICINE

## 2025-08-05 PROCEDURE — 99222 1ST HOSP IP/OBS MODERATE 55: CPT | Performed by: INTERNAL MEDICINE

## 2025-08-05 PROCEDURE — 84550 ASSAY OF BLOOD/URIC ACID: CPT | Performed by: ORTHOPAEDIC SURGERY

## 2025-08-05 PROCEDURE — 85004 AUTOMATED DIFF WBC COUNT: CPT | Performed by: ORTHOPAEDIC SURGERY

## 2025-08-05 PROCEDURE — 82247 BILIRUBIN TOTAL: CPT | Performed by: INTERNAL MEDICINE

## 2025-08-05 PROCEDURE — 99232 SBSQ HOSP IP/OBS MODERATE 35: CPT | Performed by: INTERNAL MEDICINE

## 2025-08-05 RX ORDER — PIPERACILLIN SODIUM, TAZOBACTAM SODIUM 3; .375 G/15ML; G/15ML
3.38 INJECTION, POWDER, LYOPHILIZED, FOR SOLUTION INTRAVENOUS EVERY 6 HOURS
Status: DISCONTINUED | OUTPATIENT
Start: 2025-08-05 | End: 2025-08-05

## 2025-08-05 RX ORDER — DOXYCYCLINE 100 MG/1
100 CAPSULE ORAL EVERY 12 HOURS SCHEDULED
Status: DISCONTINUED | OUTPATIENT
Start: 2025-08-05 | End: 2025-08-07 | Stop reason: HOSPADM

## 2025-08-05 RX ORDER — NALOXONE HYDROCHLORIDE 0.4 MG/ML
0.2 INJECTION, SOLUTION INTRAMUSCULAR; INTRAVENOUS; SUBCUTANEOUS
Status: DISCONTINUED | OUTPATIENT
Start: 2025-08-05 | End: 2025-08-07 | Stop reason: HOSPADM

## 2025-08-05 RX ORDER — NALOXONE HYDROCHLORIDE 0.4 MG/ML
0.4 INJECTION, SOLUTION INTRAMUSCULAR; INTRAVENOUS; SUBCUTANEOUS
Status: DISCONTINUED | OUTPATIENT
Start: 2025-08-05 | End: 2025-08-07 | Stop reason: HOSPADM

## 2025-08-05 RX ORDER — CEFTAZIDIME 2 G/1
2 INJECTION, POWDER, FOR SOLUTION INTRAVENOUS EVERY 8 HOURS
Status: DISCONTINUED | OUTPATIENT
Start: 2025-08-05 | End: 2025-08-07 | Stop reason: HOSPADM

## 2025-08-05 RX ORDER — SODIUM CHLORIDE 9 MG/ML
INJECTION, SOLUTION INTRAVENOUS CONTINUOUS
Status: DISCONTINUED | OUTPATIENT
Start: 2025-08-05 | End: 2025-08-05

## 2025-08-05 RX ORDER — LEVOFLOXACIN 5 MG/ML
500 INJECTION, SOLUTION INTRAVENOUS EVERY 24 HOURS
Status: DISCONTINUED | OUTPATIENT
Start: 2025-08-06 | End: 2025-08-07 | Stop reason: HOSPADM

## 2025-08-05 RX ORDER — LEVOFLOXACIN 5 MG/ML
750 INJECTION, SOLUTION INTRAVENOUS EVERY 24 HOURS
Status: DISCONTINUED | OUTPATIENT
Start: 2025-08-05 | End: 2025-08-05

## 2025-08-05 RX ADMIN — NALTREXONE HYDROCHLORIDE 50 MG: 50 TABLET, FILM COATED ORAL at 17:40

## 2025-08-05 RX ADMIN — CEFTAZIDIME 2 G: 2 INJECTION, POWDER, FOR SOLUTION INTRAVENOUS at 21:16

## 2025-08-05 RX ADMIN — CEFTAZIDIME 2 G: 2 INJECTION, POWDER, FOR SOLUTION INTRAVENOUS at 13:50

## 2025-08-05 RX ADMIN — VANCOMYCIN HYDROCHLORIDE 1000 MG: 1 INJECTION, SOLUTION INTRAVENOUS at 19:19

## 2025-08-05 RX ADMIN — ESCITALOPRAM OXALATE 10 MG: 10 TABLET ORAL at 08:21

## 2025-08-05 RX ADMIN — DOXYCYCLINE HYCLATE 100 MG: 100 CAPSULE ORAL at 13:03

## 2025-08-05 RX ADMIN — VANCOMYCIN HYDROCHLORIDE 1000 MG: 1 INJECTION, SOLUTION INTRAVENOUS at 08:16

## 2025-08-05 RX ADMIN — DOXYCYCLINE HYCLATE 100 MG: 100 CAPSULE ORAL at 21:15

## 2025-08-05 RX ADMIN — LEVOFLOXACIN 750 MG: 5 INJECTION, SOLUTION INTRAVENOUS at 06:30

## 2025-08-05 ASSESSMENT — ACTIVITIES OF DAILY LIVING (ADL)
ADLS_ACUITY_SCORE: 15
ADLS_ACUITY_SCORE: 41
ADLS_ACUITY_SCORE: 20
ADLS_ACUITY_SCORE: 15
ADLS_ACUITY_SCORE: 20
ADLS_ACUITY_SCORE: 20
ADLS_ACUITY_SCORE: 15
ADLS_ACUITY_SCORE: 20
ADLS_ACUITY_SCORE: 15
ADLS_ACUITY_SCORE: 20
ADLS_ACUITY_SCORE: 15
ADLS_ACUITY_SCORE: 15
ADLS_ACUITY_SCORE: 20
ADLS_ACUITY_SCORE: 15
ADLS_ACUITY_SCORE: 41
ADLS_ACUITY_SCORE: 15
ADLS_ACUITY_SCORE: 20

## 2025-08-06 LAB
CREAT SERPL-MCNC: 0.91 MG/DL (ref 0.67–1.17)
EGFRCR SERPLBLD CKD-EPI 2021: >90 ML/MIN/1.73M2

## 2025-08-06 PROCEDURE — 99232 SBSQ HOSP IP/OBS MODERATE 35: CPT | Performed by: INTERNAL MEDICINE

## 2025-08-06 PROCEDURE — 82565 ASSAY OF CREATININE: CPT | Performed by: INTERNAL MEDICINE

## 2025-08-06 PROCEDURE — 250N000013 HC RX MED GY IP 250 OP 250 PS 637: Performed by: INTERNAL MEDICINE

## 2025-08-06 PROCEDURE — 120N000001 HC R&B MED SURG/OB

## 2025-08-06 PROCEDURE — 99233 SBSQ HOSP IP/OBS HIGH 50: CPT | Performed by: NURSE PRACTITIONER

## 2025-08-06 PROCEDURE — 250N000011 HC RX IP 250 OP 636: Performed by: INTERNAL MEDICINE

## 2025-08-06 PROCEDURE — 36415 COLL VENOUS BLD VENIPUNCTURE: CPT | Performed by: INTERNAL MEDICINE

## 2025-08-06 RX ADMIN — LEVOFLOXACIN 500 MG: 5 INJECTION, SOLUTION INTRAVENOUS at 10:20

## 2025-08-06 RX ADMIN — DOXYCYCLINE HYCLATE 100 MG: 100 CAPSULE ORAL at 10:20

## 2025-08-06 RX ADMIN — CEFTAZIDIME 2 G: 2 INJECTION, POWDER, FOR SOLUTION INTRAVENOUS at 14:09

## 2025-08-06 RX ADMIN — CEFTAZIDIME 2 G: 2 INJECTION, POWDER, FOR SOLUTION INTRAVENOUS at 20:25

## 2025-08-06 RX ADMIN — ESCITALOPRAM OXALATE 10 MG: 10 TABLET ORAL at 10:20

## 2025-08-06 RX ADMIN — CEFTAZIDIME 2 G: 2 INJECTION, POWDER, FOR SOLUTION INTRAVENOUS at 06:12

## 2025-08-06 RX ADMIN — NALTREXONE HYDROCHLORIDE 50 MG: 50 TABLET, FILM COATED ORAL at 17:17

## 2025-08-06 RX ADMIN — DOXYCYCLINE HYCLATE 100 MG: 100 CAPSULE ORAL at 20:26

## 2025-08-06 ASSESSMENT — ACTIVITIES OF DAILY LIVING (ADL)
ADLS_ACUITY_SCORE: 20

## 2025-08-07 VITALS
SYSTOLIC BLOOD PRESSURE: 108 MMHG | DIASTOLIC BLOOD PRESSURE: 62 MMHG | WEIGHT: 150 LBS | BODY MASS INDEX: 23.54 KG/M2 | HEART RATE: 55 BPM | HEIGHT: 67 IN | TEMPERATURE: 97.8 F | OXYGEN SATURATION: 96 % | RESPIRATION RATE: 16 BRPM

## 2025-08-07 LAB
BACTERIA SPEC CULT: NORMAL
BACTERIA SPEC CULT: NORMAL
CREAT SERPL-MCNC: 1.01 MG/DL (ref 0.67–1.17)
CRP SERPL-MCNC: 11.07 MG/L
EGFRCR SERPLBLD CKD-EPI 2021: >90 ML/MIN/1.73M2

## 2025-08-07 PROCEDURE — 250N000013 HC RX MED GY IP 250 OP 250 PS 637: Performed by: INTERNAL MEDICINE

## 2025-08-07 PROCEDURE — 36415 COLL VENOUS BLD VENIPUNCTURE: CPT | Performed by: INTERNAL MEDICINE

## 2025-08-07 PROCEDURE — 250N000011 HC RX IP 250 OP 636: Performed by: INTERNAL MEDICINE

## 2025-08-07 PROCEDURE — 99239 HOSP IP/OBS DSCHRG MGMT >30: CPT | Performed by: NURSE PRACTITIONER

## 2025-08-07 PROCEDURE — 82565 ASSAY OF CREATININE: CPT | Performed by: INTERNAL MEDICINE

## 2025-08-07 PROCEDURE — 86140 C-REACTIVE PROTEIN: CPT | Performed by: PHYSICIAN ASSISTANT

## 2025-08-07 PROCEDURE — 99232 SBSQ HOSP IP/OBS MODERATE 35: CPT | Performed by: INTERNAL MEDICINE

## 2025-08-07 RX ORDER — LEVOFLOXACIN 500 MG/1
500 TABLET, FILM COATED ORAL DAILY
Qty: 10 TABLET | Refills: 0 | Status: SHIPPED | OUTPATIENT
Start: 2025-08-07 | End: 2025-08-17

## 2025-08-07 RX ORDER — DOXYCYCLINE 100 MG/1
100 CAPSULE ORAL EVERY 12 HOURS
Qty: 20 CAPSULE | Refills: 0 | Status: SHIPPED | OUTPATIENT
Start: 2025-08-07 | End: 2025-08-17

## 2025-08-07 RX ADMIN — CEFTAZIDIME 2 G: 2 INJECTION, POWDER, FOR SOLUTION INTRAVENOUS at 04:31

## 2025-08-07 RX ADMIN — ESCITALOPRAM OXALATE 10 MG: 10 TABLET ORAL at 08:04

## 2025-08-07 RX ADMIN — DOXYCYCLINE HYCLATE 100 MG: 100 CAPSULE ORAL at 08:04

## 2025-08-07 RX ADMIN — LEVOFLOXACIN 500 MG: 5 INJECTION, SOLUTION INTRAVENOUS at 09:28

## 2025-08-07 RX ADMIN — CEFTAZIDIME 2 G: 2 INJECTION, POWDER, FOR SOLUTION INTRAVENOUS at 12:14

## 2025-08-07 ASSESSMENT — ACTIVITIES OF DAILY LIVING (ADL)
ADLS_ACUITY_SCORE: 20

## 2025-08-09 LAB
BACTERIA SPEC CULT: NO GROWTH
BACTERIA SPEC CULT: NO GROWTH

## (undated) DEVICE — SU VICRYL 4-0 PS-2 18" UND J496H

## (undated) DEVICE — ESU GROUND PAD UNIVERSAL W/O CORD

## (undated) DEVICE — CLIP APPLIER ENDO 5MM M/L LIGAMAX EL5ML

## (undated) DEVICE — SUCTION IRR STRYKERFLOW II W/TIP 250-070-520

## (undated) DEVICE — SOL WATER IRRIG 1000ML BOTTLE 2F7114

## (undated) DEVICE — GLOVE PROTEXIS W/NEU-THERA 7.5  2D73TE75

## (undated) DEVICE — SU VICRYL 0 UR-6 27" J603H

## (undated) DEVICE — ESU HOLDER LAP INST DISP PURPLE LONG 330MM H-PRO-330

## (undated) DEVICE — SUCTION CANISTER MEDIVAC LINER 3000ML W/LID 65651-530

## (undated) DEVICE — ENDO TROCAR FIRST ENTRY KII FIOS Z-THRD 11X100MM CTF33

## (undated) DEVICE — PACK LAP CHOLE SLC15LCFSD

## (undated) DEVICE — GLOVE PROTEXIS BLUE W/NEU-THERA 7.5  2D73EB75

## (undated) DEVICE — DEVICE SUTURE GRASPER TROCAR CLOSURE 14GA PMITCSG

## (undated) DEVICE — ENDO TROCAR FIRST ENTRY KII FIOS Z-THRD 05X100MM CTF03

## (undated) DEVICE — ENDO TROCAR SLEEVE KII Z-THREADED 05X100MM CTS02

## (undated) DEVICE — LINEN TOWEL PACK X5 5464

## (undated) DEVICE — SOL NACL 0.9% INJ 1000ML BAG 2B1324X

## (undated) DEVICE — ENDO POUCH UNIV RETRIEVAL SYSTEM INZII 10MM CD001

## (undated) DEVICE — PREP CHLORAPREP 26ML TINTED ORANGE  260815

## (undated) RX ORDER — APREPITANT 40 MG/1
CAPSULE ORAL
Status: DISPENSED
Start: 2018-08-26

## (undated) RX ORDER — HYDROMORPHONE HYDROCHLORIDE 1 MG/ML
INJECTION, SOLUTION INTRAMUSCULAR; INTRAVENOUS; SUBCUTANEOUS
Status: DISPENSED
Start: 2018-08-26

## (undated) RX ORDER — PROPOFOL 10 MG/ML
INJECTION, EMULSION INTRAVENOUS
Status: DISPENSED
Start: 2018-08-26

## (undated) RX ORDER — BUPIVACAINE HYDROCHLORIDE AND EPINEPHRINE 2.5; 5 MG/ML; UG/ML
INJECTION, SOLUTION EPIDURAL; INFILTRATION; INTRACAUDAL; PERINEURAL
Status: DISPENSED
Start: 2018-08-26

## (undated) RX ORDER — FENTANYL CITRATE 50 UG/ML
INJECTION, SOLUTION INTRAMUSCULAR; INTRAVENOUS
Status: DISPENSED
Start: 2018-08-26

## (undated) RX ORDER — GLYCOPYRROLATE 0.2 MG/ML
INJECTION, SOLUTION INTRAMUSCULAR; INTRAVENOUS
Status: DISPENSED
Start: 2018-08-26

## (undated) RX ORDER — LIDOCAINE HYDROCHLORIDE 20 MG/ML
INJECTION, SOLUTION EPIDURAL; INFILTRATION; INTRACAUDAL; PERINEURAL
Status: DISPENSED
Start: 2018-08-26